# Patient Record
Sex: FEMALE | Race: WHITE | NOT HISPANIC OR LATINO | Employment: OTHER | URBAN - METROPOLITAN AREA
[De-identification: names, ages, dates, MRNs, and addresses within clinical notes are randomized per-mention and may not be internally consistent; named-entity substitution may affect disease eponyms.]

---

## 2019-09-25 ENCOUNTER — OFFICE VISIT (OUTPATIENT)
Dept: PULMONOLOGY | Facility: CLINIC | Age: 64
End: 2019-09-25

## 2019-09-25 VITALS
BODY MASS INDEX: 45.36 KG/M2 | OXYGEN SATURATION: 96 % | HEART RATE: 81 BPM | HEIGHT: 67 IN | TEMPERATURE: 97.7 F | DIASTOLIC BLOOD PRESSURE: 80 MMHG | SYSTOLIC BLOOD PRESSURE: 120 MMHG | WEIGHT: 289 LBS

## 2019-09-25 DIAGNOSIS — R06.02 SOB (SHORTNESS OF BREATH): Primary | ICD-10-CM

## 2019-09-25 DIAGNOSIS — E66.01 OBESITY, CLASS III, BMI 40-49.9 (MORBID OBESITY) (HCC): ICD-10-CM

## 2019-09-25 DIAGNOSIS — G47.33 OSA ON CPAP: ICD-10-CM

## 2019-09-25 DIAGNOSIS — J45.909 IDIOPATHIC ASTHMA: ICD-10-CM

## 2019-09-25 DIAGNOSIS — Z78.9 NON-SMOKER: ICD-10-CM

## 2019-09-25 DIAGNOSIS — Z99.89 OSA ON CPAP: ICD-10-CM

## 2019-09-25 PROCEDURE — 86003 ALLG SPEC IGE CRUDE XTRC EA: CPT | Performed by: INTERNAL MEDICINE

## 2019-09-25 PROCEDURE — 83520 IMMUNOASSAY QUANT NOS NONAB: CPT | Performed by: INTERNAL MEDICINE

## 2019-09-25 PROCEDURE — 82785 ASSAY OF IGE: CPT | Performed by: INTERNAL MEDICINE

## 2019-09-25 PROCEDURE — 94729 DIFFUSING CAPACITY: CPT | Performed by: INTERNAL MEDICINE

## 2019-09-25 PROCEDURE — 36415 COLL VENOUS BLD VENIPUNCTURE: CPT | Performed by: INTERNAL MEDICINE

## 2019-09-25 PROCEDURE — 85007 BL SMEAR W/DIFF WBC COUNT: CPT | Performed by: INTERNAL MEDICINE

## 2019-09-25 PROCEDURE — 85027 COMPLETE CBC AUTOMATED: CPT | Performed by: INTERNAL MEDICINE

## 2019-09-25 PROCEDURE — 94060 EVALUATION OF WHEEZING: CPT | Performed by: INTERNAL MEDICINE

## 2019-09-25 PROCEDURE — 80053 COMPREHEN METABOLIC PANEL: CPT | Performed by: INTERNAL MEDICINE

## 2019-09-25 PROCEDURE — 99204 OFFICE O/P NEW MOD 45 MIN: CPT | Performed by: INTERNAL MEDICINE

## 2019-09-25 PROCEDURE — 86256 FLUORESCENT ANTIBODY TITER: CPT | Performed by: INTERNAL MEDICINE

## 2019-09-25 PROCEDURE — 94726 PLETHYSMOGRAPHY LUNG VOLUMES: CPT | Performed by: INTERNAL MEDICINE

## 2019-09-25 RX ORDER — MONTELUKAST SODIUM 10 MG/1
10 TABLET ORAL EVERY EVENING
Refills: 5 | COMMUNITY
Start: 2019-07-17

## 2019-09-25 RX ORDER — CETIRIZINE HYDROCHLORIDE 10 MG/1
10 TABLET ORAL DAILY
COMMUNITY

## 2019-09-25 RX ORDER — FLUOXETINE HYDROCHLORIDE 20 MG/1
CAPSULE ORAL
COMMUNITY
Start: 2019-09-24 | End: 2022-10-25

## 2019-09-25 RX ORDER — BISOPROLOL FUMARATE AND HYDROCHLOROTHIAZIDE 5; 6.25 MG/1; MG/1
1 TABLET ORAL DAILY
Refills: 2 | COMMUNITY
Start: 2019-07-02

## 2019-09-25 RX ORDER — ALBUTEROL SULFATE 90 UG/1
AEROSOL, METERED RESPIRATORY (INHALATION)
COMMUNITY
Start: 2019-08-16 | End: 2022-03-23

## 2019-09-25 RX ORDER — NAPROXEN 500 MG/1
TABLET ORAL
COMMUNITY

## 2019-09-25 RX ORDER — ALBUTEROL SULFATE 90 UG/1
AEROSOL, METERED RESPIRATORY (INHALATION)
COMMUNITY
End: 2022-10-25 | Stop reason: SDUPTHER

## 2019-09-25 RX ORDER — SACCHAROMYCES BOULARDII 250 MG
250 CAPSULE ORAL 2 TIMES DAILY
COMMUNITY
End: 2022-03-23

## 2019-09-25 RX ORDER — ESTROGENS, CONJUGATED 1.25 MG
1.25 TABLET ORAL DAILY
Refills: 11 | COMMUNITY
Start: 2019-09-05 | End: 2022-03-23

## 2019-09-25 RX ORDER — MULTIPLE VITAMINS W/ MINERALS TAB 9MG-400MCG
1 TAB ORAL DAILY
COMMUNITY

## 2019-09-25 RX ORDER — ABATACEPT 125 MG/ML
INJECTION, SOLUTION SUBCUTANEOUS
COMMUNITY
Start: 2019-09-19

## 2019-09-25 RX ORDER — ALBUTEROL SULFATE 90 UG/1
2 AEROSOL, METERED RESPIRATORY (INHALATION) ONCE
Status: COMPLETED | OUTPATIENT
Start: 2019-09-25 | End: 2019-09-25

## 2019-09-25 RX ORDER — HYDROXYCHLOROQUINE SULFATE 200 MG/1
200 TABLET, FILM COATED ORAL 2 TIMES DAILY
Refills: 1 | COMMUNITY
Start: 2019-08-23

## 2019-09-25 RX ADMIN — ALBUTEROL SULFATE 4 PUFF: 90 AEROSOL, METERED RESPIRATORY (INHALATION) at 15:22

## 2019-09-26 PROBLEM — Z99.89 OSA ON CPAP: Status: ACTIVE | Noted: 2019-09-26

## 2019-09-26 PROBLEM — Z78.9 NON-SMOKER: Status: ACTIVE | Noted: 2019-09-26

## 2019-09-26 PROBLEM — G47.33 OSA ON CPAP: Status: ACTIVE | Noted: 2019-09-26

## 2019-09-26 PROBLEM — E66.01 OBESITY, CLASS III, BMI 40-49.9 (MORBID OBESITY) (HCC): Status: ACTIVE | Noted: 2019-09-26

## 2019-09-26 PROBLEM — J45.909 IDIOPATHIC ASTHMA: Status: ACTIVE | Noted: 2019-09-26

## 2019-09-26 PROBLEM — M06.9 RHEUMATOID ARTHRITIS (HCC): Status: ACTIVE | Noted: 2019-09-26

## 2019-09-26 LAB
ALBUMIN SERPL-MCNC: 4.1 G/DL (ref 3.5–5.2)
ALBUMIN/GLOB SERPL: 1.9 G/DL
ALP SERPL-CCNC: 50 U/L (ref 39–117)
ALT SERPL W P-5'-P-CCNC: 21 U/L (ref 1–33)
ANION GAP SERPL CALCULATED.3IONS-SCNC: 14.4 MMOL/L (ref 5–15)
AST SERPL-CCNC: 25 U/L (ref 1–32)
BASOPHILS # BLD MANUAL: 0.11 10*3/MM3 (ref 0–0.2)
BASOPHILS NFR BLD AUTO: 2 % (ref 0–1.5)
BILIRUB SERPL-MCNC: 0.2 MG/DL (ref 0.2–1.2)
BUN BLD-MCNC: 9 MG/DL (ref 8–23)
BUN/CREAT SERPL: 19.1 (ref 7–25)
CALCIUM SPEC-SCNC: 8.9 MG/DL (ref 8.6–10.5)
CHLORIDE SERPL-SCNC: 105 MMOL/L (ref 98–107)
CO2 SERPL-SCNC: 26.6 MMOL/L (ref 22–29)
CREAT BLD-MCNC: 0.47 MG/DL (ref 0.57–1)
DEPRECATED RDW RBC AUTO: 44.2 FL (ref 37–54)
EOSINOPHIL # BLD MANUAL: 0.06 10*3/MM3 (ref 0–0.4)
EOSINOPHIL NFR BLD MANUAL: 1 % (ref 0.3–6.2)
ERYTHROCYTE [DISTWIDTH] IN BLOOD BY AUTOMATED COUNT: 13.6 % (ref 12.3–15.4)
GFR SERPL CREATININE-BSD FRML MDRD: 134 ML/MIN/1.73
GLOBULIN UR ELPH-MCNC: 2.2 GM/DL
GLUCOSE BLD-MCNC: 139 MG/DL (ref 65–99)
HCT VFR BLD AUTO: 43 % (ref 34–46.6)
HGB BLD-MCNC: 13.6 G/DL (ref 12–15.9)
LYMPHOCYTES # BLD MANUAL: 0.75 10*3/MM3 (ref 0.7–3.1)
LYMPHOCYTES NFR BLD MANUAL: 13.3 % (ref 19.6–45.3)
LYMPHOCYTES NFR BLD MANUAL: 5.1 % (ref 5–12)
MCH RBC QN AUTO: 27.9 PG (ref 26.6–33)
MCHC RBC AUTO-ENTMCNC: 31.6 G/DL (ref 31.5–35.7)
MCV RBC AUTO: 88.3 FL (ref 79–97)
MONOCYTES # BLD AUTO: 0.29 10*3/MM3 (ref 0.1–0.9)
NEUTROPHILS # BLD AUTO: 4.41 10*3/MM3 (ref 1.7–7)
NEUTROPHILS NFR BLD MANUAL: 78.6 % (ref 42.7–76)
PLAT MORPH BLD: NORMAL
PLATELET # BLD AUTO: 219 10*3/MM3 (ref 140–450)
PMV BLD AUTO: 10.1 FL (ref 6–12)
POTASSIUM BLD-SCNC: 3.5 MMOL/L (ref 3.5–5.2)
PROT SERPL-MCNC: 6.3 G/DL (ref 6–8.5)
RBC # BLD AUTO: 4.87 10*6/MM3 (ref 3.77–5.28)
RBC MORPH BLD: NORMAL
SODIUM BLD-SCNC: 146 MMOL/L (ref 136–145)
WBC MORPH BLD: NORMAL
WBC NRBC COR # BLD: 5.61 10*3/MM3 (ref 3.4–10.8)

## 2019-09-26 NOTE — PROGRESS NOTES
PULMONARY  NOTE    Chief Complaint     Dyspnea, rheumatoid arthritis, non-smoker, obstructive sleep apnea, morbid obesity    History of Present Illness     64-year-old white female referred for evaluation of recurrent respiratory symptoms.    She is a lifelong non-smoker.  She does have a history of asthma.    She has dyspnea on exertion, not at rest.  She would get short of breath going up about 1 flight of steps.    She has frequent episodes of acute bronchitis.  She is been on approximately 4 courses of antibiotics in the last year for acute respiratory tract infections.    She has only intermittent reflux symptoms and does not have reflux on a regular basis.    She typically does not have a cough except when she has an acute respiratory tract infection.    She does have sinus drainage and postnasal drip fairly regularly.  She has not had allergy testing or allergy shots.    She has a history of rheumatoid arthritis and is on Plaquenil and methotrexate as well as Orencia.    She has been on Advair 250 with albuterol.    She has a history of obstructive sleep apnea and uses CPAP nightly.  She continues to have daytime fatigue and nonrestorative sleep.    Patient Active Problem List   Diagnosis   • Chronic persistent asthma   • Non-smoker   • Obesity, Class III, BMI 40-49.9 (morbid obesity) (CMS/[facility])   • YARA on CPAP   • Rheumatoid arthritis (CMS/[facility])     Allergies   Allergen Reactions   • Adalimumab Hives   • Benoxinate Other (See Comments)       Current Outpatient Medications:   •  ADVAIR DISKUS 250-50 MCG/DOSE DISKUS, INHALE 1 PUFF BY MOUTH EVERY 12 HOURS IN THE MORNING AND IN THE EVENING, Disp: , Rfl: 5  •  albuterol sulfate HFA (PROAIR HFA) 108 (90 Base) MCG/ACT inhaler, ProAir HFA 90 mcg/actuation aerosol inhaler, Disp: , Rfl:   •  albuterol sulfate  (90 Base) MCG/ACT inhaler, , Disp: , Rfl:   •  bisoprolol-hydrochlorothiazide (ZIAC) 5-6.25 MG per tablet, Take 1 tablet by mouth Daily., Disp: , Rfl:  "2  •  cetirizine (zyrTEC) 10 MG tablet, Take 10 mg by mouth Daily., Disp: , Rfl:   •  FLUoxetine (PROzac) 20 MG capsule, , Disp: , Rfl:   •  hydroxychloroquine (PLAQUENIL) 200 MG tablet, Take 200 mg by mouth 2 (Two) Times a Day., Disp: , Rfl: 1  •  Influenza Vac Subunit Quad (FLUCELVAX) 0.5 ML suspension prefilled syringe injection, Flucelvax Quad [id number] (PF) 60 mcg (15 mcg x 4)/0.5 mL IM syringe, Disp: , Rfl:   •  methotrexate 2.5 MG tablet, Take  by mouth 3 (Three) Doses Each Week. Take Doses 12 (Twelve) Hours Apart., Disp: , Rfl:   •  montelukast (SINGULAIR) 10 MG tablet, Take 10 mg by mouth Every Evening., Disp: , Rfl: 5  •  Multiple Vitamins-Minerals (MULTIVITAMIN WITH MINERALS) tablet tablet, Take 1 tablet by mouth Daily., Disp: , Rfl:   •  naproxen (NAPROSYN) 500 MG tablet, naproxen 500 mg tablet, Disp: , Rfl:   •  ORENCIA 125 MG/ML solution prefilled syringe, , Disp: , Rfl:   •  PREMARIN 1.25 MG tablet, Take 1.25 mg by mouth Daily., Disp: , Rfl: 11  •  saccharomyces boulardii (FLORASTOR) 250 MG capsule, Take 250 mg by mouth 2 (Two) Times a Day., Disp: , Rfl:   No current facility-administered medications for this visit.   MEDICATION LIST AND ALLERGIES REVIEWED.    Family History   Problem Relation Age of Onset   • Diabetes Mother    • Diabetes Father    • Heart failure Father    • Diabetes Sister    • Diabetes Brother      Social History     Tobacco Use   • Smoking status: Never Smoker   • Smokeless tobacco: Never Used   Substance Use Topics   • Alcohol use: No     Frequency: Never   • Drug use: No     Social History     Social History Narrative        Retired teacher and artist    Lifelong non-smoker    Seldom drinks alcohol    No history of drug abuse     FAMILY AND SOCIAL HISTORY REVIEWED.    Review of Systems  ALSO REFER TO SCANNED ROS SHEET FROM SAME DATE.    /80 (BP Location: Left arm, Patient Position: Sitting)   Pulse 81   Temp 97.7 °F (36.5 °C)   Ht 170.2 cm (67\")   Wt 131 kg " (289 lb)   SpO2 96% Comment: resting at room air  BMI 45.26 kg/m²   Physical Exam   Constitutional: She is oriented to person, place, and time. She appears well-developed. No distress.   HENT:   Head: Normocephalic and atraumatic.   Neck: No thyromegaly present.   Cardiovascular: Normal rate, regular rhythm and normal heart sounds.   No murmur heard.  Pulmonary/Chest: Effort normal and breath sounds normal. No stridor.   Abdominal: Soft. Bowel sounds are normal.   Musculoskeletal: Normal range of motion. She exhibits no edema.   Lymphadenopathy:     She has no cervical adenopathy.        Right: No supraclavicular and no epitrochlear adenopathy present.        Left: No supraclavicular and no epitrochlear adenopathy present.   Neurological: She is alert and oriented to person, place, and time.   Skin: Skin is warm and dry. She is not diaphoretic.   Psychiatric: She has a normal mood and affect. Her behavior is normal.   Nursing note and vitals reviewed.      Results     PFTs reveal moderate airway obstruction, no significant change in FEV1 after bronchodilator.  No restriction in normal corrected diffusion capacity.    PA and lateral chest x-ray reveals no evidence of active disease    Problem List       ICD-10-CM ICD-9-CM   1. SOB (shortness of breath) R06.02 786.05   2. Chronic persistent asthma J45.909 493.90   3. Non-smoker Z78.9 V49.89   4. Obesity, Class III, BMI 40-49.9 (morbid obesity) (CMS/[facility]) E66.01 278.01   5. YARA on CPAP G47.33 327.23    Z99.89 V46.8       Discussion     We reviewed her test results.  She has moderate obstructive airways disease.  This is most likely due to chronic persistent asthma.  An obliterative bronchiolitis related to her underlying autoimmune disease could be a consideration, as well.    I have recommended increased ICS therapy.  I changed her Advair to Breo 201 puff a day with the addition of Arnuity 201 puff a day.  We will also check an IgE level and eosinophil count.  She  might benefit from a biologic agent.    We will also check an alpha-1 antitrypsin screen.    Given her recurrent respiratory tract infections and autoimmune disease, I recommended a high-resolution CT scan of the chest which we will arrange.    She may not be getting adequate oxygen supplementation at night despite her CPAP.  We will check 2 nights of nocturnal pulse oximetry with her on CPAP.  If pulse oximetry does not look good, then would get a titration sleep study.  She has nonrestorative sleep, daytime fatigue and hypersomnolence.    I recommended a regular exercise program and efforts at weight loss.    She would benefit from a transthoracic echocardiogram.    I will plan to see her back in a month or so for follow-up    Foreign Gray MD  Note electronically signed    CC: Josafat King MD

## 2019-09-28 LAB
C-ANCA TITR SER IF: NORMAL TITER
MYELOPEROXIDASE AB SER-ACNC: <9 U/ML (ref 0–9)
P-ANCA ATYPICAL TITR SER IF: NORMAL TITER
P-ANCA TITR SER IF: NORMAL TITER
PROTEINASE3 AB SER IA-ACNC: <3.5 U/ML (ref 0–3.5)

## 2019-09-30 LAB
A ALTERNATA IGE QN: <0.1 KU/L
A FUMIGATUS IGE QN: <0.1 KU/L
AMER ROACH IGE QN: <0.1 KU/L
BAHIA GRASS IGE QN: <0.1 KU/L
BERMUDA GRASS IGE QN: <0.1 KU/L
BOXELDER IGE QN: <0.1 KU/L
C HERBARUM IGE QN: <0.1 KU/L
CAT DANDER IGG QN: 0.26 KU/L
CMN PIGWEED IGE QN: <0.1 KU/L
COMMON RAGWEED IGE QN: <0.1 KU/L
CONV CLASS DESCRIPTION: ABNORMAL
D FARINAE IGE QN: <0.1 KU/L
D PTERONYSS IGE QN: 0.16 KU/L
DOG DANDER IGE QN: 0.24 KU/L
ENGL PLANTAIN IGE QN: <0.1 KU/L
HAZELNUT POLN IGE QN: <0.1 KU/L
JOHNSON GRASS IGE QN: <0.1 KU/L
KENT BLUE GRASS IGE QN: <0.1 KU/L
M RACEMOSUS IGE QN: <0.1 KU/L
MT JUNIPER IGE QN: <0.1 KU/L
MUGWORT IGE QN: <0.1 KU/L
NETTLE IGE QN: <0.1 KU/L
P NOTATUM IGE QN: <0.1 KU/L
S BOTRYOSUM IGE QN: <0.1 KU/L
SHEEP SORREL IGE QN: <0.1 KU/L
SWEET GUM IGE QN: <0.1 KU/L
T011-IGE MAPLE LEAF SYCAMORE: <0.1 KU/L
WHITE ELM IGE QN: 0.11 KU/L
WHITE HICKORY IGE QN: <0.1 KU/L
WHITE MULBERRY IGE QN: <0.1 KU/L
WHITE OAK IGE QN: <0.1 KU/L

## 2019-10-01 LAB — TOTAL IGE SMQN RAST: 21 IU/ML (ref 6–495)

## 2019-10-03 ENCOUNTER — DOCUMENTATION (OUTPATIENT)
Dept: PULMONOLOGY | Facility: CLINIC | Age: 64
End: 2019-10-03

## 2019-10-03 DIAGNOSIS — G47.33 OSA ON CPAP: Primary | ICD-10-CM

## 2019-10-03 DIAGNOSIS — Z99.89 OSA ON CPAP: Primary | ICD-10-CM

## 2019-10-03 NOTE — PROGRESS NOTES
I received ONPO results back from [facility]. [facility] wants to order oxygen but the patient has YARA and she must have a sleep study titration study first per insurance to get oxygen added. I have called the patient and LVM for her to return my call.

## 2019-10-16 ENCOUNTER — OFFICE VISIT (OUTPATIENT)
Dept: PULMONOLOGY | Facility: CLINIC | Age: 64
End: 2019-10-16

## 2019-10-16 VITALS
HEART RATE: 76 BPM | BODY MASS INDEX: 45.83 KG/M2 | DIASTOLIC BLOOD PRESSURE: 80 MMHG | OXYGEN SATURATION: 92 % | SYSTOLIC BLOOD PRESSURE: 130 MMHG | HEIGHT: 67 IN | WEIGHT: 292 LBS | TEMPERATURE: 98.4 F

## 2019-10-16 DIAGNOSIS — J45.909 IDIOPATHIC ASTHMA: ICD-10-CM

## 2019-10-16 DIAGNOSIS — Z78.9 NON-SMOKER: ICD-10-CM

## 2019-10-16 DIAGNOSIS — Z99.89 OSA ON CPAP: ICD-10-CM

## 2019-10-16 DIAGNOSIS — E66.01 OBESITY, CLASS III, BMI 40-49.9 (MORBID OBESITY) (HCC): ICD-10-CM

## 2019-10-16 DIAGNOSIS — R06.02 SOB (SHORTNESS OF BREATH): Primary | ICD-10-CM

## 2019-10-16 DIAGNOSIS — G47.33 OSA ON CPAP: ICD-10-CM

## 2019-10-16 PROCEDURE — 94060 EVALUATION OF WHEEZING: CPT | Performed by: INTERNAL MEDICINE

## 2019-10-16 PROCEDURE — 99214 OFFICE O/P EST MOD 30 MIN: CPT | Performed by: INTERNAL MEDICINE

## 2019-10-16 RX ORDER — ALBUTEROL SULFATE 90 UG/1
2 AEROSOL, METERED RESPIRATORY (INHALATION) ONCE
Status: COMPLETED | OUTPATIENT
Start: 2019-10-16 | End: 2019-10-16

## 2019-10-16 RX ADMIN — ALBUTEROL SULFATE 4 PUFF: 90 AEROSOL, METERED RESPIRATORY (INHALATION) at 10:31

## 2019-10-17 NOTE — PROGRESS NOTES
PULMONARY  NOTE    Chief Complaint     Chronic persistent asthma, non-smoker, rheumatoid arthritis, class III obesity, obstructive sleep apnea and nocturnal hypoxemia    History of Present Illness     64-year-old white female returns today for follow-up.  Initially saw her on 9/25/2019.    She is a lifelong non-smoker but does have a history of asthma.  Prior PFTs revealed moderate airway obstruction consistent with chronic persistent asthma.  On her last visit I added Arnuity 200 one puff to her Breo 200.    We obtained the labs but IgE and eosinophil count were unremarkable.    We did 2 nights of nocturnal pulse oximetry.  She does desaturate despite CPAP.  Insurance will not allow us just add supplemental oxygen, she has to have a titration sleep study.    For some reason alpha-1 antitrypsin screen is not back yet.    She has a history of rheumatoid arthritis and remains on Plaquenil, methotrexate, and Orencia.    She has sinus congestion, drainage, and postnasal drip which tends to be seasonal.    Only intermittent reflux symptoms and not on a regular basis.    Patient Active Problem List   Diagnosis   • Chronic persistent asthma   • Non-smoker   • Obesity, Class III, BMI 40-49.9 (morbid obesity) (CMS/[facility])   • YARA on CPAP   • Rheumatoid arthritis (CMS/[facility])     Allergies   Allergen Reactions   • Adalimumab Hives   • Benoxinate Other (See Comments)       Current Outpatient Medications:   •  albuterol sulfate HFA (PROAIR HFA) 108 (90 Base) MCG/ACT inhaler, ProAir HFA 90 mcg/actuation aerosol inhaler, Disp: , Rfl:   •  albuterol sulfate  (90 Base) MCG/ACT inhaler, , Disp: , Rfl:   •  bisoprolol-hydrochlorothiazide (ZIAC) 5-6.25 MG per tablet, Take 1 tablet by mouth Daily., Disp: , Rfl: 2  •  cetirizine (zyrTEC) 10 MG tablet, Take 10 mg by mouth Daily., Disp: , Rfl:   •  FLUoxetine (PROzac) 20 MG capsule, , Disp: , Rfl:   •  Fluticasone Furoate (ARNUITY ELLIPTA) 200 MCG/ACT aerosol powder , Inhale., Disp: ,  Rfl:   •  Fluticasone Furoate-Vilanterol (BREO ELLIPTA) 200-25 MCG/INH inhaler, Inhale 1 puff Daily., Disp: , Rfl:   •  hydroxychloroquine (PLAQUENIL) 200 MG tablet, Take 200 mg by mouth 2 (Two) Times a Day., Disp: , Rfl: 1  •  Methotrexate 2.5 MG/ML solution, Take  by mouth., Disp: , Rfl:   •  montelukast (SINGULAIR) 10 MG tablet, Take 10 mg by mouth Every Evening., Disp: , Rfl: 5  •  Multiple Vitamins-Minerals (MULTIVITAMIN WITH MINERALS) tablet tablet, Take 1 tablet by mouth Daily., Disp: , Rfl:   •  naproxen (NAPROSYN) 500 MG tablet, naproxen 500 mg tablet, Disp: , Rfl:   •  ORENCIA 125 MG/ML solution prefilled syringe, , Disp: , Rfl:   •  PREMARIN 1.25 MG tablet, Take 1.25 mg by mouth Daily., Disp: , Rfl: 11  •  saccharomyces boulardii (FLORASTOR) 250 MG capsule, Take 250 mg by mouth 2 (Two) Times a Day., Disp: , Rfl:   •  ADVAIR DISKUS 250-50 MCG/DOSE DISKUS, INHALE 1 PUFF BY MOUTH EVERY 12 HOURS IN THE MORNING AND IN THE EVENING, Disp: , Rfl: 5  •  Influenza Vac Subunit Quad (FLUCELVAX) 0.5 ML suspension prefilled syringe injection, Flucelvax Quad [id number] (PF) 60 mcg (15 mcg x 4)/0.5 mL IM syringe, Disp: , Rfl:   •  methotrexate 2.5 MG tablet, Take  by mouth 3 (Three) Doses Each Week. Take Doses 12 (Twelve) Hours Apart., Disp: , Rfl:   No current facility-administered medications for this visit.   MEDICATION LIST AND ALLERGIES REVIEWED.    Family History   Problem Relation Age of Onset   • Diabetes Mother    • Diabetes Father    • Heart failure Father    • Diabetes Sister    • Diabetes Brother      Social History     Tobacco Use   • Smoking status: Never Smoker   • Smokeless tobacco: Never Used   Substance Use Topics   • Alcohol use: No     Frequency: Never   • Drug use: No     Social History     Social History Narrative        Retired teacher and artist    Lifelong non-smoker    Seldom drinks alcohol    No history of drug abuse     FAMILY AND SOCIAL HISTORY REVIEWED.    Review of Systems  ALSO REFER  "TO SCANNED ROS SHEET FROM SAME DATE.    /80   Pulse 76   Temp 98.4 °F (36.9 °C)   Ht 170.2 cm (67\")   Wt 132 kg (292 lb)   SpO2 92% Comment: room air at rest  BMI 45.73 kg/m²   Physical Exam   Constitutional: She is oriented to person, place, and time. She appears well-developed. No distress.   HENT:   Head: Normocephalic and atraumatic.   Neck: No thyromegaly present.   Cardiovascular: Normal rate, regular rhythm and normal heart sounds.   No murmur heard.  Pulmonary/Chest: Effort normal and breath sounds normal. No stridor.   Abdominal: Soft. Bowel sounds are normal.   Musculoskeletal: Normal range of motion. She exhibits no edema.   Lymphadenopathy:     She has no cervical adenopathy.        Right: No supraclavicular and no epitrochlear adenopathy present.        Left: No supraclavicular and no epitrochlear adenopathy present.   Neurological: She is alert and oriented to person, place, and time.   Skin: Skin is warm and dry. She is not diaphoretic.   Psychiatric: She has a normal mood and affect. Her behavior is normal.   Nursing note and vitals reviewed.      Results     Labs obtained last visit were relatively unremarkable with a normal IgE level, total eosinophil count of 60.  Rast panel was really only if positive for cat, dog, and dust mites.    Desaturates periodically at night with nocturnal pulse oximetry despite CPAP.    Alpha-1 antitrypsin screen still pending    Problem List       ICD-10-CM ICD-9-CM   1. SOB (shortness of breath) R06.02 786.05   2. Chronic persistent asthma J45.909 493.90   3. Non-smoker Z78.9 V49.89   4. Obesity, Class III, BMI 40-49.9 (morbid obesity) (CMS/[facility]) E66.01 278.01   5. YARA on CPAP G47.33 327.23    Z99.89 V46.8       Discussion     We discussed her test results.  At this point, labs really do not support the use of a biologic agent for asthma.    She currently is doing okay with Breo 200 and Arnuity 200, 1 puff of each on a daily basis.    In the future, might " consider an HRCT scan to see if there is any evidence of obliterative bronchiolitis associated with her rheumatoid arthritis.    We need to follow-up on her alpha-1 antitrypsin screen.    Unfortunately, she must have a titration sleep study to be able to get supplemental oxygen with her CPAP (or other modification of her YARA therapy).    In the future, consider a transthoracic echocardiogram.    I will plan to see her back in a couple of months for follow-up    Foreign Gray MD  Note electronically signed    CC: Josafat King MD

## 2019-12-29 ENCOUNTER — HOSPITAL ENCOUNTER (OUTPATIENT)
Dept: SLEEP MEDICINE | Facility: HOSPITAL | Age: 64
Discharge: HOME | End: 2019-12-29
Admitting: INTERNAL MEDICINE

## 2019-12-29 VITALS
HEART RATE: 98 BPM | SYSTOLIC BLOOD PRESSURE: 174 MMHG | DIASTOLIC BLOOD PRESSURE: 89 MMHG | HEIGHT: 67 IN | OXYGEN SATURATION: 96 % | BODY MASS INDEX: 45.99 KG/M2 | WEIGHT: 293 LBS

## 2019-12-29 DIAGNOSIS — G47.33 OSA ON CPAP: ICD-10-CM

## 2019-12-29 DIAGNOSIS — Z99.89 OSA ON CPAP: ICD-10-CM

## 2019-12-29 PROCEDURE — 95811 POLYSOM 6/>YRS CPAP 4/> PARM: CPT

## 2019-12-29 PROCEDURE — 95811 POLYSOM 6/>YRS CPAP 4/> PARM: CPT | Performed by: INTERNAL MEDICINE

## 2020-01-15 ENCOUNTER — TELEPHONE (OUTPATIENT)
Dept: PULMONOLOGY | Facility: CLINIC | Age: 65
End: 2020-01-15

## 2020-02-28 ENCOUNTER — OFFICE VISIT (OUTPATIENT)
Dept: PULMONOLOGY | Facility: CLINIC | Age: 65
End: 2020-02-28

## 2020-02-28 VITALS
WEIGHT: 284 LBS | TEMPERATURE: 97.3 F | OXYGEN SATURATION: 95 % | HEART RATE: 74 BPM | BODY MASS INDEX: 44.57 KG/M2 | DIASTOLIC BLOOD PRESSURE: 70 MMHG | SYSTOLIC BLOOD PRESSURE: 120 MMHG | HEIGHT: 67 IN

## 2020-02-28 DIAGNOSIS — R06.02 SOB (SHORTNESS OF BREATH): Primary | ICD-10-CM

## 2020-02-28 DIAGNOSIS — Z99.89 OSA ON CPAP: ICD-10-CM

## 2020-02-28 DIAGNOSIS — Z78.9 NON-SMOKER: ICD-10-CM

## 2020-02-28 DIAGNOSIS — E66.01 OBESITY, CLASS III, BMI 40-49.9 (MORBID OBESITY) (HCC): ICD-10-CM

## 2020-02-28 DIAGNOSIS — G47.33 OSA ON CPAP: ICD-10-CM

## 2020-02-28 DIAGNOSIS — G47.34 NOCTURNAL HYPOXIA: ICD-10-CM

## 2020-02-28 DIAGNOSIS — J45.909 IDIOPATHIC ASTHMA: ICD-10-CM

## 2020-02-28 PROCEDURE — 99214 OFFICE O/P EST MOD 30 MIN: CPT | Performed by: NURSE PRACTITIONER

## 2020-02-28 PROCEDURE — 94375 RESPIRATORY FLOW VOLUME LOOP: CPT | Performed by: NURSE PRACTITIONER

## 2020-02-28 NOTE — PROGRESS NOTES
[facility] Pulmonary Follow Up Note    Chief Complaint:  Chief Complaint   Patient presents with   • Shortness of Breath   • Follow-up       History of Present Illness:  Amaya Brown is a 64 y.o.female here today for Follow-up SOB and Chronic Persistent Asthma.  She was last seen in the office on 10/16/2019 by Dr. Ricardo Gray.    She is a lifelong non-smoker, but has a history of asthma. PFTs from initial office visit in September, 2019 revealed moderate airway obstruction consistent with chronic persistent asthma. Arnuity 200 was added to her Breo 200 with improvement in symptoms. She has rarely needed her albuterol inhaler. Her SOB has significantly improved. She denies productive cough, hemoptysis, chest pain, palpitations, or lower extremity edema.     Patient did have Flu-like symptoms a few weeks ago and was treated with Omnicor, but did not tolerate it d/t diarrhea. She subsequently developed a UTI treated with Cipro with improvement. She has not been on any other antibiotics or required steroids since her last OV.    Labs were obtained. IgE and eosinophil count were unremarkable. ANCA Panel WNL. Alpha-1 antitrypsin screen was negative with Genotype M/M. Allergen panel was significantly positive for cat, dog, dust mites, and Elm. Patient also states that horses are a major trigger for her as well.      She does have YARA with CPAP use. ONPO indicated need for oxygen as well, which was confirmed with a Titration Sleep Study. She now has 2 L supplemental O2 bled into CPAP, that she wears nightly. She states that she is sleeping good and feel refreshed in the morning.      She has a history of rheumatoid arthritis and [facility]. She has been on Plaquenil and methotrexate for over 10 years. She was more recently started on Humira, but did not tolerate so she was switched to Orencia.     She does admit to seasonal sinus congestion with drainage and postnasal drip. She  states that she previously had some reflux symptoms, but has not had any for several months.     She is also working on weight loss and had an intentional 18 pound loss since she was in the office last.     I have reviewed the patients past medical, past surgical and family history as noted in Epic.     Subjective     Social History     Socioeconomic History   • Marital status:      Spouse name: Not on file   • Number of children: Not on file   • Years of education: Not on file   • Highest education level: Not on file   Tobacco Use   • Smoking status: Never Smoker   • Smokeless tobacco: Never Used   Substance and Sexual Activity   • Alcohol use: No     Frequency: Never   • Drug use: No   • Sexual activity: Defer   Social History Narrative        Retired teacher and artist    Lifelong non-smoker    Seldom drinks alcohol    No history of drug abuse         Current Outpatient Medications:   •  albuterol sulfate HFA (PROAIR HFA) 108 (90 Base) MCG/ACT inhaler, ProAir HFA 90 mcg/actuation aerosol inhaler, Disp: , Rfl:   •  albuterol sulfate  (90 Base) MCG/ACT inhaler, , Disp: , Rfl:   •  bisoprolol-hydrochlorothiazide (ZIAC) 5-6.25 MG per tablet, Take 1 tablet by mouth Daily., Disp: , Rfl: 2  •  cetirizine (zyrTEC) 10 MG tablet, Take 10 mg by mouth Daily., Disp: , Rfl:   •  FLUoxetine (PROzac) 20 MG capsule, , Disp: , Rfl:   •  Fluticasone Furoate (ARNUITY ELLIPTA) 200 MCG/ACT aerosol powder , Inhale 1 Act Daily., Disp: 30 each, Rfl: 11  •  Fluticasone Furoate-Vilanterol (BREO ELLIPTA) 200-25 MCG/INH inhaler, Inhale 1 puff Daily., Disp: 28 each, Rfl: 11  •  hydroxychloroquine (PLAQUENIL) 200 MG tablet, Take 200 mg by mouth 2 (Two) Times a Day., Disp: , Rfl: 1  •  Influenza Vac Subunit Quad (FLUCELVAX) 0.5 ML suspension prefilled syringe injection, Flucelvax Quad [id number] (PF) 60 mcg (15 mcg x 4)/0.5 mL IM syringe, Disp: , Rfl:   •  methotrexate 2.5 MG tablet, Take  by mouth 3 (Three) Doses Each Week.  Take Doses 12 (Twelve) Hours Apart., Disp: , Rfl:   •  montelukast (SINGULAIR) 10 MG tablet, Take 10 mg by mouth Every Evening., Disp: , Rfl: 5  •  Multiple Vitamins-Minerals (MULTIVITAMIN WITH MINERALS) tablet tablet, Take 1 tablet by mouth Daily., Disp: , Rfl:   •  naproxen (NAPROSYN) 500 MG tablet, naproxen 500 mg tablet, Disp: , Rfl:   •  ORENCIA 125 MG/ML solution prefilled syringe, , Disp: , Rfl:   •  PREMARIN 1.25 MG tablet, Take 1.25 mg by mouth Daily., Disp: , Rfl: 11  •  saccharomyces boulardii (FLORASTOR) 250 MG capsule, Take 250 mg by mouth 2 (Two) Times a Day., Disp: , Rfl:     Allergies   Allergen Reactions   • Adalimumab Hives   • Benoxinate Other (See Comments)       Immunization History   Administered Date(s) Administered   • Flu Vaccine Intradermal Quad 18-64YR 02/02/2017, 10/11/2017, 10/25/2018   • Flu Vaccine Quad PF >36MO 10/09/2018   • Influenza, Unspecified 10/01/2019   • Tdap 10/09/2018   • flucelvax quad pfs =>4 YRS 09/27/2019       Review of Systems:    Review of Systems   Constitutional: Negative for chills, fatigue and fever.   HENT: Negative for congestion, rhinorrhea, sinus pressure, trouble swallowing and voice change.    Eyes: Negative for blurred vision and visual disturbance.   Respiratory: Positive for shortness of breath. Negative for apnea, cough, chest tightness and wheezing.    Cardiovascular: Negative for chest pain, palpitations and leg swelling.   Gastrointestinal: Positive for diarrhea. Negative for abdominal pain, nausea and vomiting.   Endocrine: Negative for cold intolerance and heat intolerance.   Genitourinary: Negative for dysuria and hematuria.   Musculoskeletal: Negative for arthralgias and joint swelling.   Skin: Negative for rash and bruise.   Allergic/Immunologic: Negative for environmental allergies, food allergies and immunocompromised state.   Neurological: Negative for dizziness, speech difficulty, weakness and headache.   Hematological: Negative for  "adenopathy. Does not bruise/bleed easily.   Psychiatric/Behavioral: Negative for dysphoric mood. The patient is not nervous/anxious.        Objective     Vital Signs:  Vitals:    02/28/20 1230   BP: 120/70   Pulse: 74   Temp: 97.3 °F (36.3 °C)   SpO2: 95%  Comment: resting, room air   Weight: 129 kg (284 lb)   Height: 170 cm (66.93\")       Physical Exam:    Physical Exam   Constitutional: She is oriented to person, place, and time. She appears well-developed and well-nourished.   HENT:   Head: Normocephalic and atraumatic.   Mouth/Throat: Oropharynx is clear and moist.   Eyes: Pupils are equal, round, and reactive to light. EOM are normal.   Neck: Normal range of motion. Neck supple.   Cardiovascular: Normal rate and regular rhythm.   Pulmonary/Chest: Effort normal and breath sounds normal. No respiratory distress. She has no wheezes.   Abdominal: Soft. Bowel sounds are normal.   Musculoskeletal: Normal range of motion. She exhibits edema (trace BLE).   Lymphadenopathy:        Head (right side): No submental, no submandibular and no tonsillar adenopathy present.        Head (left side): No submental, no submandibular and no tonsillar adenopathy present.     She has no cervical adenopathy.   Neurological: She is alert and oriented to person, place, and time.   Skin: Skin is warm and dry.   Psychiatric: She has a normal mood and affect. Judgment normal.   Nursing note and vitals reviewed.      Results Review:    I reviewed the patient's new clinical results.    No radiology results for the last 30 days.    Spirometry Interpretation (@TD):   PFTS in the office today, read by me.    1. Mild airway obstruction. (FEV1 > 70% pred.).  2. The maximum voluntary ventilation (MVV) is normal.  3. Pre-Bronchodilators FEV1 = 1.95 L. (71 % of predicted)    Assessment/Plan   Assessment / Plan:    Problem List Items Addressed This Visit        Respiratory    Chronic persistent asthma    Relevant Medications    Fluticasone " Furoate-Vilanterol (BREO ELLIPTA) 200-25 MCG/INH inhaler    Fluticasone Furoate (ARNUITY ELLIPTA) 200 MCG/ACT aerosol powder     YARA on CPAP       Digestive    Obesity, Class III, BMI 40-49.9 (morbid obesity) (CMS/[facility])       Other    Non-smoker      Other Visit Diagnoses     SOB (shortness of breath)    -  Primary    Relevant Orders    Spirometry Without Bronchodilator (Completed)    Nocturnal hypoxia              Discussion:    We discussed her test results today. PFT today show improvement in airway obstruction.  FEV1/FVC ratio from 61% to 67% predicted. FEV1 improved from 64% to 71% since her initial office visit in September, 2019. She states that she feels better and her shortness of breath has improved. She rarely uses her rescue inhaler.      She will continue to use Breo 200 and Arnuity 200, 1 puff of each on a daily basis. She has done well with the addition of Arnuity. I reordered both inhalers and provided samples of Breo in the office today.     Dr Gray mentioned possibly ordering a HRCT scan in the future to see if there is any evidence of obliterative bronchiolitis associated with her rheumatoid arthritis, however patient is doing well. Might consider scan at later date.      She will continue using her CPAP nightly with supplemental 2 L Oxygen. We discussed her Titration Study today. She has good restorative sleep.     Her BMI is 44.6. She is currently attempting to loose weight and has lost 18 pounds. Her goal is to loose 100 pounds over time. She is trying to do it in a healthy way and is using [employer] to track food and calories. I applauded her effort and encouraged continued healthy eating with the addition of physical exercise. This is limited somewhat d/t her RA. We discussed a healthy weight loss of no more that 2 pounds weekly.     She will continue to follow with Dr Gardner for RA at the [facility]. She is currently on Plaquenil, Methotrexate, and Orencia, which she is  tolerating well.     She will follow up in the clinic with Dr Ricardo Gray in the next 5-6 months with repeat spirometry. She will call if she has any additional needs in the interim.     Plan of care was reviewed with the patient at the conclusion of today's visit. Education was provided regarding diagnosis, management, and any prescribed or recommended over the counter medications. Patient verbalizes understanding of and agreement with management plan.     I spent 25 minutes with the patient. I spent > 50% percent of this time counseling and discussing diagnosis, prognosis, diagnostic testing, evaluation, current status, treatment options and management.    NEHEMIAS Claderon  Electronically signed     Please note that portions of this note were completed with a voice recognition program. Efforts were made to edit the dictations, but occasionally words are mistranscribed.

## 2020-08-28 ENCOUNTER — TELEPHONE (OUTPATIENT)
Dept: PULMONOLOGY | Facility: CLINIC | Age: 65
End: 2020-08-28

## 2020-08-28 NOTE — TELEPHONE ENCOUNTER
Samples of Rx Breo 200 and Arnuity 200 placed up front ready for . Pt advised to call the office and staff member will bring to car. Pt verbalized understanding and appreciation.

## 2020-09-25 ENCOUNTER — OFFICE VISIT (OUTPATIENT)
Dept: PULMONOLOGY | Facility: CLINIC | Age: 65
End: 2020-09-25

## 2020-09-25 VITALS
TEMPERATURE: 97.1 F | WEIGHT: 276 LBS | OXYGEN SATURATION: 95 % | SYSTOLIC BLOOD PRESSURE: 132 MMHG | BODY MASS INDEX: 43.32 KG/M2 | HEIGHT: 67 IN | DIASTOLIC BLOOD PRESSURE: 80 MMHG | HEART RATE: 83 BPM

## 2020-09-25 DIAGNOSIS — G47.33 OSA ON CPAP: ICD-10-CM

## 2020-09-25 DIAGNOSIS — Z99.89 OSA ON CPAP: ICD-10-CM

## 2020-09-25 DIAGNOSIS — Z78.9 NON-SMOKER: ICD-10-CM

## 2020-09-25 DIAGNOSIS — E66.01 OBESITY, CLASS III, BMI 40-49.9 (MORBID OBESITY) (HCC): ICD-10-CM

## 2020-09-25 DIAGNOSIS — J45.909 IDIOPATHIC ASTHMA: Primary | ICD-10-CM

## 2020-09-25 DIAGNOSIS — Z87.39 HISTORY OF RHEUMATOID ARTHRITIS: ICD-10-CM

## 2020-09-25 PROCEDURE — 99214 OFFICE O/P EST MOD 30 MIN: CPT | Performed by: INTERNAL MEDICINE

## 2020-09-25 NOTE — PROGRESS NOTES
PULMONARY  NOTE    Chief Complaint     Chronic persistent asthma, non-smoker, rheumatoid arthritis, class III obesity, YARA with nocturnal hypoxemia    History of Present Illness     65-year-old white female returns today for follow-up.  She was last seen by Sade Clay on 2/2020    She is a lifelong non-smoker and has chronic persistent asthma.  She has moderate airway obstruction by prior spirometry  She has been on high-dose ICS with Breo 200 and Arnuity 201 puff of each a day  She is had some insurance changes due to health issues with her .  She is having trouble getting these inhalers covered at this time but after the first of the year apparently will be on Medicare.    IgE level and eosinophil count in the past have been unremarkable.    She has obstructive sleep apnea and uses CPAP.  We also did nocturnal pulse oximetry while on CPAP which revealed nocturnal desaturation  She eventually had undergo a titration sleep study which was performed.  She is now using CPAP with supplemental oxygen at night and tolerating it well    She has intermittent reflux symptoms on a regular basis    She has sinus drainage and postnasal drip typically seasonal    She has rheumatoid arthritis and previously had been on Plaquenil, methotrexate, and Orencia.  Currently, only on Plaquenil do the same insurance issues.  She follows with Dr. Gardner    Patient Active Problem List   Diagnosis   • Chronic persistent asthma   • Non-smoker   • Obesity, Class III, BMI 40-49.9 (morbid obesity) (CMS/[facility])   • YARA on CPAP   • Rheumatoid arthritis     Allergies   Allergen Reactions   • Adalimumab Hives   • Benoxinate Other (See Comments)       Current Outpatient Medications:   •  albuterol sulfate HFA (PROAIR HFA) 108 (90 Base) MCG/ACT inhaler, ProAir HFA 90 mcg/actuation aerosol inhaler, Disp: , Rfl:   •  albuterol sulfate  (90 Base) MCG/ACT inhaler, , Disp: , Rfl:   •  bisoprolol-hydrochlorothiazide (ZIAC) 5-6.25 MG per  tablet, Take 1 tablet by mouth Daily., Disp: , Rfl: 2  •  cetirizine (zyrTEC) 10 MG tablet, Take 10 mg by mouth Daily., Disp: , Rfl:   •  FLUoxetine (PROzac) 20 MG capsule, , Disp: , Rfl:   •  Fluticasone Furoate (ARNUITY ELLIPTA) 200 MCG/ACT aerosol powder , Inhale 1 Act Daily., Disp: 30 each, Rfl: 11  •  Fluticasone Furoate-Vilanterol (BREO ELLIPTA) 200-25 MCG/INH inhaler, Inhale 1 puff Daily., Disp: 28 each, Rfl: 11  •  hydroxychloroquine (PLAQUENIL) 200 MG tablet, Take 200 mg by mouth 2 (Two) Times a Day., Disp: , Rfl: 1  •  Influenza Vac Subunit Quad (FLUCELVAX) 0.5 ML suspension prefilled syringe injection, Flucelvax Quad [id number] (PF) 60 mcg (15 mcg x 4)/0.5 mL IM syringe, Disp: , Rfl:   •  montelukast (SINGULAIR) 10 MG tablet, Take 10 mg by mouth Every Evening., Disp: , Rfl: 5  •  Multiple Vitamins-Minerals (MULTIVITAMIN WITH MINERALS) tablet tablet, Take 1 tablet by mouth Daily., Disp: , Rfl:   •  naproxen (NAPROSYN) 500 MG tablet, naproxen 500 mg tablet, Disp: , Rfl:   •  saccharomyces boulardii (FLORASTOR) 250 MG capsule, Take 250 mg by mouth 2 (Two) Times a Day., Disp: , Rfl:   •  methotrexate 2.5 MG tablet, Take  by mouth 3 (Three) Doses Each Week. Take Doses 12 (Twelve) Hours Apart., Disp: , Rfl:   •  ORENCIA 125 MG/ML solution prefilled syringe, , Disp: , Rfl:   •  PREMARIN 1.25 MG tablet, Take 1.25 mg by mouth Daily., Disp: , Rfl: 11  MEDICATION LIST AND ALLERGIES REVIEWED.    Family History   Problem Relation Age of Onset   • Diabetes Mother    • Diabetes Father    • Heart failure Father    • Diabetes Sister    • Diabetes Brother      Social History     Tobacco Use   • Smoking status: Never Smoker   • Smokeless tobacco: Never Used   Substance Use Topics   • Alcohol use: No     Frequency: Never   • Drug use: No     Social History     Social History Narrative        Retired teacher and artist    Lifelong non-smoker    Seldom drinks alcohol    No history of drug abuse     FAMILY AND SOCIAL  "HISTORY REVIEWED.    Review of Systems   Constitutional: Negative.  Negative for fatigue and unexpected weight change.   HENT: Positive for postnasal drip. Negative for rhinorrhea.    Respiratory: Positive for shortness of breath and wheezing. Negative for cough.    Cardiovascular: Negative.  Negative for chest pain, palpitations and leg swelling.   Gastrointestinal: Negative for abdominal distention and nausea.   Musculoskeletal: Positive for arthralgias and back pain. Negative for joint swelling.   Skin: Negative.  Negative for rash and wound.   Allergic/Immunologic: Negative.  Negative for immunocompromised state.   Hematological: Negative.  Negative for adenopathy.   Psychiatric/Behavioral: Positive for sleep disturbance.     ALSO REFER TO SCANNED ROS SHEET FROM SAME DATE.    /80   Pulse 83   Temp 97.1 °F (36.2 °C)   Ht 170 cm (66.93\")   Wt 125 kg (276 lb)   LMP  (LMP Unknown)   SpO2 95% Comment: resting at room air  BMI 43.32 kg/m²   Physical Exam  Vitals signs and nursing note reviewed.   Constitutional:       General: She is not in acute distress.     Appearance: She is well-developed. She is not diaphoretic.   HENT:      Head: Normocephalic and atraumatic.   Neck:      Thyroid: No thyromegaly.   Cardiovascular:      Rate and Rhythm: Normal rate and regular rhythm.      Heart sounds: Normal heart sounds. No murmur.   Pulmonary:      Effort: Pulmonary effort is normal.      Breath sounds: Normal breath sounds. No stridor.   Abdominal:      General: Bowel sounds are normal.      Palpations: Abdomen is soft.   Musculoskeletal: Normal range of motion.      Right lower leg: No edema.      Left lower leg: No edema.   Lymphadenopathy:      Cervical: No cervical adenopathy.      Upper Body:      Right upper body: No supraclavicular or epitrochlear adenopathy.      Left upper body: No supraclavicular or epitrochlear adenopathy.   Skin:     General: Skin is warm and dry.   Neurological:      Mental Status: " She is alert and oriented to person, place, and time.   Psychiatric:         Behavior: Behavior normal.         Results       Problem List       ICD-10-CM ICD-9-CM   1. Chronic persistent asthma  J45.909 493.90   2. Non-smoker  Z78.9 V49.89   3. Obesity, Class III, BMI 40-49.9 (morbid obesity) (CMS/[facility])  E66.01 278.01   4. YARA on CPAP  G47.33 327.23    Z99.89 V46.8   5. Rheumatoid arthritis  Z87.39 V13.4       Discussion     She appears relatively stable from a pulmonary standpoint.  She is going to remain on her current regimen which is Breo 200 with Arnuity 201 puff of each once a day.  Also, albuterol on an as-needed basis.    We might consider a biologic agent in the future although she has not qualified based on labs in the past.    I encouraged her to follow-up with Dr. Gardner.    I encouraged continued compliance with CPAP and supplemental oxygen.  Also efforts at regular exercise and weight loss.    I will plan to see her back in 4 months with repeat spirometry    Foreign Gray MD  Note electronically signed    CC: Josafat King MD

## 2020-11-05 ENCOUNTER — DOCUMENTATION (OUTPATIENT)
Dept: PULMONOLOGY | Facility: CLINIC | Age: 65
End: 2020-11-05

## 2021-02-25 DIAGNOSIS — Z23 IMMUNIZATION DUE: ICD-10-CM

## 2021-12-21 DIAGNOSIS — J45.909 IDIOPATHIC ASTHMA: Primary | ICD-10-CM

## 2021-12-21 RX ORDER — FLUTICASONE FUROATE 200 UG/1
POWDER RESPIRATORY (INHALATION)
Qty: 60 EACH | Refills: 0 | OUTPATIENT
Start: 2021-12-21

## 2021-12-21 RX ORDER — FLUTICASONE FUROATE 200 UG/1
200 POWDER RESPIRATORY (INHALATION) DAILY
Qty: 30 EACH | Refills: 0 | Status: SHIPPED | OUTPATIENT
Start: 2021-12-21 | End: 2022-03-08 | Stop reason: SDUPTHER

## 2022-03-08 DIAGNOSIS — J45.909 IDIOPATHIC ASTHMA: ICD-10-CM

## 2022-03-08 RX ORDER — FLUTICASONE FUROATE 200 UG/1
200 POWDER RESPIRATORY (INHALATION) DAILY
Qty: 30 EACH | Refills: 5 | Status: SHIPPED | OUTPATIENT
Start: 2022-03-08 | End: 2022-03-23 | Stop reason: SDUPTHER

## 2022-03-08 NOTE — TELEPHONE ENCOUNTER
Refilled Rx Breo 200 and Arnuity 200 per chart via fax sent to [facility] per pt's request.

## 2022-03-11 DIAGNOSIS — Z01.812 BLOOD TESTS PRIOR TO TREATMENT OR PROCEDURE: Primary | ICD-10-CM

## 2022-03-23 ENCOUNTER — OFFICE VISIT (OUTPATIENT)
Dept: PULMONOLOGY | Facility: CLINIC | Age: 67
End: 2022-03-23

## 2022-03-23 VITALS
SYSTOLIC BLOOD PRESSURE: 146 MMHG | TEMPERATURE: 97.5 F | BODY MASS INDEX: 44.41 KG/M2 | OXYGEN SATURATION: 96 % | HEART RATE: 74 BPM | WEIGHT: 293 LBS | HEIGHT: 68 IN | DIASTOLIC BLOOD PRESSURE: 82 MMHG | RESPIRATION RATE: 18 BRPM

## 2022-03-23 DIAGNOSIS — J45.909 IDIOPATHIC ASTHMA: ICD-10-CM

## 2022-03-23 DIAGNOSIS — Z78.9 NON-SMOKER: ICD-10-CM

## 2022-03-23 DIAGNOSIS — Z01.818 PREOPERATIVE CLEARANCE: Primary | ICD-10-CM

## 2022-03-23 DIAGNOSIS — G47.33 OSA ON CPAP: ICD-10-CM

## 2022-03-23 DIAGNOSIS — Z99.89 OSA ON CPAP: ICD-10-CM

## 2022-03-23 PROCEDURE — 94729 DIFFUSING CAPACITY: CPT | Performed by: NURSE PRACTITIONER

## 2022-03-23 PROCEDURE — 99214 OFFICE O/P EST MOD 30 MIN: CPT | Performed by: NURSE PRACTITIONER

## 2022-03-23 PROCEDURE — 94375 RESPIRATORY FLOW VOLUME LOOP: CPT | Performed by: NURSE PRACTITIONER

## 2022-03-23 PROCEDURE — 94726 PLETHYSMOGRAPHY LUNG VOLUMES: CPT | Performed by: NURSE PRACTITIONER

## 2022-03-23 RX ORDER — FLUTICASONE FUROATE 200 UG/1
200 POWDER RESPIRATORY (INHALATION) DAILY
Qty: 1 EACH | Refills: 11 | Status: SHIPPED | OUTPATIENT
Start: 2022-03-23 | End: 2022-10-18 | Stop reason: SDUPTHER

## 2022-03-23 NOTE — PROGRESS NOTES
"[facility] Pulmonary Follow up      Chief Complaint  Clearance for Surgery    Subjective          Amaya Brown presents to [facility] PULMONARY & CRITICAL CARE MEDICINE for preoperative clearance, right hip surgery, [facility], Dr Mann.  She had her left side at around 3 years ago and did very well.    We follow her here in the office for mild intermittent asthma.  She was last seen in September 2020.  Since then she has been doing fairly well.  She is currently on Breo and Arnuity daily.  She has very rare use of her rescue inhaler.  She has had no recent acute exacerbation or illness.  No use of antibiotics or steroids.    She does have obstructive sleep apnea and uses CPAP nightly.  She has a fullface mask and average around 6 to 7 hours a night.  Since she was here her primary care did an overnight oximetry study and was she was placed on 2 L with oxygen with her CPAP.    She also has rheumatoid arthritis and is currently holding her medications for surgery.  She is having some worsening joint pain.  She is off her methotrexate secondary to liver issues.        Objective     Vital Signs:   /82   Pulse 74   Temp 97.5 °F (36.4 °C)   Resp 18   Ht 172.7 cm (68\")   Wt (!) 136 kg (300 lb 6.4 oz)   SpO2 96%   BMI 45.68 kg/m²       Immunization History   Administered Date(s) Administered   • Flu Vaccine Intradermal Quad 18-64YR 02/02/2017, 10/11/2017, 10/25/2018   • Flu Vaccine Quad PF >36MO 10/09/2018   • Influenza, Unspecified 10/01/2019   • Tdap 10/09/2018   • flucelvax quad pfs =>4 YRS 09/27/2019       Physical Exam  Vitals reviewed.   Constitutional:       General: She is not in acute distress.     Appearance: She is well-developed. She is obese. She is not ill-appearing.   HENT:      Head: Normocephalic and atraumatic.   Eyes:      Pupils: Pupils are equal, round, and reactive to light.   Cardiovascular:      Rate and Rhythm: Normal rate and regular rhythm.      Heart sounds: " No murmur heard.  Pulmonary:      Effort: Pulmonary effort is normal. No respiratory distress.      Breath sounds: Normal breath sounds. No wheezing or rales.   Abdominal:      General: Bowel sounds are normal. There is no distension.      Palpations: Abdomen is soft.   Musculoskeletal:         General: Normal range of motion.      Cervical back: Normal range of motion and neck supple.   Skin:     General: Skin is warm and dry.      Findings: No erythema.   Neurological:      Mental Status: She is alert and oriented to person, place, and time.   Psychiatric:         Behavior: Behavior normal.          Result Review :            PFTs done in the office today:  Mild obstruction with an FEV1 of 1.88, 70% predicted FVC was normal at 3.05 L, 87% predicted.  Some evidence of air trapping with elevated residual volume normal adjusted DLCO.    PA and lateral chest x-ray done the office today reviewed by me  Awaiting final MD interpretation                 Assessment and Plan    Problem List Items Addressed This Visit        Pulmonary and Pneumonias    Chronic persistent asthma    Relevant Medications    Fluticasone Furoate-Vilanterol (Breo Ellipta) 200-25 MCG/INH inhaler    Fluticasone Furoate (Arnuity Ellipta) 200 MCG/ACT       Sleep    YARA on CPAP       Tobacco    Non-smoker      Other Visit Diagnoses     Preoperative clearance    -  Primary    Relevant Orders    XR Chest PA & Lateral (Completed)    Pulmonary Function Test (Completed)        Ms. Hebert is here today for preoperative clearance.  She is having a hip surgery done at [facility] and will be admitted for observation.    She will continue to follow-up with her primary care for her obstructive sleep apnea with her CPAP and oxygen at night.  We did discuss how sleep apnea with hypoxemia can alter her postoperative saturations while she is waking up from anesthesia.  She did have trouble with that during her last surgery 3 years ago as well.    Her asthma seems  well controlled on the Breo and Arnuity.      ARISCAT Preoperative Pulmonary Risk Index.    Age []   <= 50 years old (0 points)    [x]   51-80 years old (3 points)    []   >80 years old (16 points)       Preoperative Oxygen Saturation [x]   >= 96% (0 points)    []   91-95% (8 points)    []   <= 90% (24 points)       Other Clinical Risk Factors []   Respiratory Infection in the last month (17 points)    []   Pre-operative anemia: Hb < 10 g/dL (11 points)    []   Emergency Surgery (8 points)       Surgical Incision []   Upper abdominal (15 points)    []   Intrathoracic (24 points)       Duration of Surgery [x]   < 2 hours (0 points)    []   2-3 hours (16 points)    []   >3 hours (23 points)       Total Criteria Point Count: 3     ARISCAT risk index interpretation:      0-25 points: Low risk  1.6 % pulmonary complication rate.   26-44 points: Intermediate risk 13.3% pulmonary complication rate.    points: High risk 42.1 % pulmonary complication rate.     Postoperative Pulmonary Complications include but are not limited to: Respiratory Failure, Pulmonary Infection, Pleural Effusion, Atelectasis, Pneumothorax Bronchospasm, Aspiration Pneumonia.        Follow Up     Return in about 6 months (around 9/23/2022).  Patient was given instructions and counseling regarding her condition or for health maintenance advice. Please see specific information pulled into the AVS if appropriate.     I spent 35 minutes caring for Amaya on this date of service. This time includes time spent by me in the following activities:preparing for the visit, reviewing tests, obtaining and/or reviewing a separately obtained history, performing a medically appropriate examination and/or evaluation , counseling and educating the patient/family/caregiver, ordering medications, tests, or procedures, referring and communicating with other health care professionals , documenting information in the medical record and independently interpreting  results and communicating that information with the patient/family/caregiver    Moderate level of Medical Decision Making complexity based on 2 or more chronic stable illnesses and prescription drug management.    NEHEMIAS Olivo, ACNP  [facility] Pulmonary Critical Care Medicine  Electronically signed

## 2022-10-18 ENCOUNTER — TELEPHONE (OUTPATIENT)
Dept: PULMONOLOGY | Facility: CLINIC | Age: 67
End: 2022-10-18

## 2022-10-18 DIAGNOSIS — J45.909 IDIOPATHIC ASTHMA: ICD-10-CM

## 2022-10-18 RX ORDER — FLUTICASONE FUROATE 200 UG/1
200 POWDER RESPIRATORY (INHALATION) DAILY
Qty: 1 EACH | Refills: 11 | Status: SHIPPED | OUTPATIENT
Start: 2022-10-18 | End: 2022-10-25

## 2022-10-25 ENCOUNTER — OFFICE VISIT (OUTPATIENT)
Dept: PULMONOLOGY | Facility: CLINIC | Age: 67
End: 2022-10-25

## 2022-10-25 VITALS
WEIGHT: 293 LBS | OXYGEN SATURATION: 95 % | HEART RATE: 76 BPM | DIASTOLIC BLOOD PRESSURE: 80 MMHG | HEIGHT: 68 IN | TEMPERATURE: 98.1 F | SYSTOLIC BLOOD PRESSURE: 122 MMHG | BODY MASS INDEX: 44.41 KG/M2

## 2022-10-25 DIAGNOSIS — G47.33 OSA ON CPAP: ICD-10-CM

## 2022-10-25 DIAGNOSIS — J45.909 IDIOPATHIC ASTHMA: Primary | ICD-10-CM

## 2022-10-25 DIAGNOSIS — Z87.39 HISTORY OF RHEUMATOID ARTHRITIS: ICD-10-CM

## 2022-10-25 DIAGNOSIS — E66.01 OBESITY, CLASS III, BMI 40-49.9 (MORBID OBESITY): ICD-10-CM

## 2022-10-25 DIAGNOSIS — Z78.9 NON-SMOKER: ICD-10-CM

## 2022-10-25 DIAGNOSIS — Z99.89 OSA ON CPAP: ICD-10-CM

## 2022-10-25 PROCEDURE — 99214 OFFICE O/P EST MOD 30 MIN: CPT | Performed by: INTERNAL MEDICINE

## 2022-10-25 RX ORDER — ALBUTEROL SULFATE 90 UG/1
2 AEROSOL, METERED RESPIRATORY (INHALATION) EVERY 6 HOURS PRN
Qty: 18 G | Refills: 11 | Status: SHIPPED | OUTPATIENT
Start: 2022-10-25

## 2022-10-25 RX ORDER — FLUTICASONE FUROATE, UMECLIDINIUM BROMIDE AND VILANTEROL TRIFENATATE 200; 62.5; 25 UG/1; UG/1; UG/1
1 POWDER RESPIRATORY (INHALATION) DAILY
Qty: 1 EACH | Refills: 11 | Status: SHIPPED | OUTPATIENT
Start: 2022-10-25

## 2022-10-25 RX ORDER — METFORMIN HYDROCHLORIDE 500 MG/1
TABLET, EXTENDED RELEASE ORAL
COMMUNITY
Start: 2022-09-14

## 2022-10-25 RX ORDER — SERTRALINE HYDROCHLORIDE 100 MG/1
100 TABLET, FILM COATED ORAL DAILY
COMMUNITY
Start: 2022-09-15

## 2022-10-25 NOTE — PROGRESS NOTES
PULMONARY  NOTE    Chief Complaint     Chronic persistent asthma, non-smoker, rheumatoid arthritis, class III obesity, YARA and nocturnal hypoxemia    History of Present Illness     67-year-old female returns today for follow-up  I last saw her 9/25/2020    She is a lifelong non-smoker with chronic obstructive airways disease  She has moderate airway obstruction on prior spirometry  She had been on high-dose ICS with Breo 200+ Arnuity  Insurance changed and these inhalers are now expensive    She has had no recent acute exacerbation of asthma    She has YARA and nocturnal hypoxemia  She remains on CPAP with supplemental oxygen at night    She has rheumatoid arthritis and remains on Plaquenil with Orencia  She is getting her eyes checked periodically    Patient Active Problem List   Diagnosis   • Chronic persistent asthma   • Non-smoker   • Obesity, Class III, BMI 40-49.9 (morbid obesity) ([facility])   • YARA on CPAP   • Rheumatoid arthritis     Allergies   Allergen Reactions   • Adalimumab Hives   • Benoxinate Other (See Comments)       Current Outpatient Medications:   •  albuterol sulfate  (90 Base) MCG/ACT inhaler, ProAir HFA 90 mcg/actuation aerosol inhaler, Disp: , Rfl:   •  bisoprolol-hydrochlorothiazide (ZIAC) 5-6.25 MG per tablet, Take 1 tablet by mouth Daily., Disp: , Rfl: 2  •  cetirizine (zyrTEC) 10 MG tablet, Take 10 mg by mouth Daily., Disp: , Rfl:   •  hydroxychloroquine (PLAQUENIL) 200 MG tablet, Take 200 mg by mouth 2 (Two) Times a Day., Disp: , Rfl: 1  •  Influenza Vac Subunit Quad (FLUCELVAX) 0.5 ML suspension prefilled syringe injection, Flucelvax Quad 4012-4212 (PF) 60 mcg (15 mcg x 4)/0.5 mL IM syringe, Disp: , Rfl:   •  metFORMIN ER (GLUCOPHAGE-XR) 500 MG 24 hr tablet, TAKE 1 TABLET BY MOUTH ONCE DAILY WITH EVENING MEAL, Disp: , Rfl:   •  methotrexate 2.5 MG tablet, Take  by mouth 3 (Three) Doses Each Week. Take Doses 12 (Twelve) Hours Apart., Disp: , Rfl:   •  montelukast (SINGULAIR) 10 MG  "tablet, Take 10 mg by mouth Every Evening., Disp: , Rfl: 5  •  Multiple Vitamins-Minerals (MULTIVITAMIN WITH MINERALS) tablet tablet, Take 1 tablet by mouth Daily., Disp: , Rfl:   •  naproxen (NAPROSYN) 500 MG tablet, naproxen 500 mg tablet, Disp: , Rfl:   •  ORENCIA 125 MG/ML solution prefilled syringe, , Disp: , Rfl:   •  sertraline (ZOLOFT) 100 MG tablet, Take 1 tablet by mouth Daily., Disp: , Rfl:   MEDICATION LIST AND ALLERGIES REVIEWED.    Family History   Problem Relation Age of Onset   • Diabetes Mother    • Diabetes Father    • Heart failure Father    • Diabetes Sister    • Diabetes Brother      Social History     Tobacco Use   • Smoking status: Never   • Smokeless tobacco: Never   Substance Use Topics   • Alcohol use: No   • Drug use: No     Social History     Social History Narrative        Retired teacher and artist    Lifelong non-smoker    Seldom drinks alcohol    No history of drug abuse     FAMILY AND SOCIAL HISTORY REVIEWED.    Review of Systems  ALSO REFER TO SCANNED ROS SHEET FROM SAME DATE.    /80   Pulse 76   Temp 98.1 °F (36.7 °C)   Ht 172.7 cm (68\")   Wt (!) 138 kg (305 lb)   LMP  (LMP Unknown)   SpO2 95% Comment: resting, room air  BMI 46.38 kg/m²   Physical Exam  Vitals and nursing note reviewed.   Constitutional:       General: She is not in acute distress.     Appearance: She is well-developed. She is not diaphoretic.   HENT:      Head: Normocephalic and atraumatic.   Neck:      Thyroid: No thyromegaly.   Cardiovascular:      Rate and Rhythm: Normal rate and regular rhythm.      Heart sounds: Normal heart sounds. No murmur heard.  Pulmonary:      Effort: Pulmonary effort is normal.      Breath sounds: Normal breath sounds. No stridor.   Lymphadenopathy:      Cervical: No cervical adenopathy.      Upper Body:      Right upper body: No supraclavicular or epitrochlear adenopathy.      Left upper body: No supraclavicular or epitrochlear adenopathy.   Skin:     General: Skin " is warm and dry.   Neurological:      Mental Status: She is alert and oriented to person, place, and time.   Psychiatric:         Behavior: Behavior normal.         Results     Immunization History   Administered Date(s) Administered   • COVID-19 (MODERNA) 1st, 2nd, 3rd Dose Only 02/24/2021, 03/24/2021, 09/15/2021, 08/18/2022   • Flu Vaccine Intradermal Quad 18-64YR 02/02/2017, 10/11/2017, 10/25/2018   • Flu Vaccine Quad PF >36MO 10/09/2018   • Fluzone High-Dose 65+yrs 11/08/2021   • Influenza, Unspecified 02/02/2017, 10/11/2017, 10/01/2019   • Tdap 10/09/2018   • flucelvax quad pfs =>4 YRS 09/27/2019     Problem List       ICD-10-CM ICD-9-CM   1. Chronic persistent asthma  J45.909 493.90   2. Non-smoker  Z78.9 V49.89   3. Obesity, Class III, BMI 40-49.9 (morbid obesity) ([facility])  E66.01 278.01   4. YARA on CPAP  G47.33 327.23    Z99.89 V46.8   5. Rheumatoid arthritis  Z87.39 V13.4       Discussion     Stable with no exacerbation of asthma recently.    Medication cost is now an issue  Checking our computer it appears that Trelegy might be better covered  Gave her samples, written instructions, and a demonstration of use  I have asked her to check the cost and get back to us with that and I could be covered    I encourage continued use of CPAP and supplemental oxygen at night    I will plan to see her back in 6 months with full PFTs    Moderate level of Medical Decision Making complexity based on 2 or more chronic stable illnesses and prescription drug management.    Foreign Gray MD  Note electronically signed    CC: Josafat King MD

## 2023-09-30 ENCOUNTER — APPOINTMENT (OUTPATIENT)
Dept: CT IMAGING | Facility: HOSPITAL | Age: 68
End: 2023-09-30
Payer: MEDICARE

## 2023-09-30 ENCOUNTER — HOSPITAL ENCOUNTER (EMERGENCY)
Facility: HOSPITAL | Age: 68
Discharge: HOME | End: 2023-09-30
Attending: EMERGENCY MEDICINE
Payer: MEDICARE

## 2023-09-30 VITALS
HEART RATE: 92 BPM | DIASTOLIC BLOOD PRESSURE: 62 MMHG | SYSTOLIC BLOOD PRESSURE: 131 MMHG | RESPIRATION RATE: 16 BRPM | BODY MASS INDEX: 42.38 KG/M2 | TEMPERATURE: 98.4 F | WEIGHT: 270 LBS | OXYGEN SATURATION: 96 % | HEIGHT: 67 IN

## 2023-09-30 DIAGNOSIS — N13.2 URETERAL STONE WITH HYDRONEPHROSIS: Primary | ICD-10-CM

## 2023-09-30 LAB
ALBUMIN SERPL-MCNC: 4 G/DL (ref 3.5–5.2)
ALBUMIN/GLOB SERPL: 1.3 G/DL
ALP SERPL-CCNC: 54 U/L (ref 39–117)
ALT SERPL W P-5'-P-CCNC: 73 U/L (ref 1–33)
ANION GAP SERPL CALCULATED.3IONS-SCNC: 14.4 MMOL/L (ref 5–15)
AST SERPL-CCNC: 60 U/L (ref 1–32)
BACTERIA UR QL AUTO: ABNORMAL /HPF
BASOPHILS # BLD AUTO: 0.06 10*3/MM3 (ref 0–0.2)
BASOPHILS NFR BLD AUTO: 0.4 % (ref 0–1.5)
BILIRUB SERPL-MCNC: 0.4 MG/DL (ref 0–1.2)
BILIRUB UR QL STRIP: NEGATIVE
BUN SERPL-MCNC: 12 MG/DL (ref 8–23)
BUN/CREAT SERPL: 14.8 (ref 7–25)
CALCIUM SPEC-SCNC: 9.2 MG/DL (ref 8.6–10.5)
CHLORIDE SERPL-SCNC: 104 MMOL/L (ref 98–107)
CLARITY UR: ABNORMAL
CO2 SERPL-SCNC: 21.6 MMOL/L (ref 22–29)
COLOR UR: YELLOW
CREAT SERPL-MCNC: 0.81 MG/DL (ref 0.57–1)
D-LACTATE SERPL-SCNC: 1.7 MMOL/L (ref 0.5–2)
DEPRECATED RDW RBC AUTO: 44.6 FL (ref 37–54)
EGFRCR SERPLBLD CKD-EPI 2021: 79.7 ML/MIN/1.73
EOSINOPHIL # BLD AUTO: 0.08 10*3/MM3 (ref 0–0.4)
EOSINOPHIL NFR BLD AUTO: 0.5 % (ref 0.3–6.2)
ERYTHROCYTE [DISTWIDTH] IN BLOOD BY AUTOMATED COUNT: 13.6 % (ref 12.3–15.4)
GLOBULIN UR ELPH-MCNC: 3.1 GM/DL
GLUCOSE SERPL-MCNC: 183 MG/DL (ref 65–99)
GLUCOSE UR STRIP-MCNC: ABNORMAL MG/DL
HCT VFR BLD AUTO: 44.7 % (ref 34–46.6)
HGB BLD-MCNC: 14.2 G/DL (ref 12–15.9)
HGB UR QL STRIP.AUTO: ABNORMAL
HOLD SPECIMEN: NORMAL
HOLD SPECIMEN: NORMAL
HYALINE CASTS UR QL AUTO: ABNORMAL /LPF
IMM GRANULOCYTES # BLD AUTO: 0.05 10*3/MM3 (ref 0–0.05)
IMM GRANULOCYTES NFR BLD AUTO: 0.3 % (ref 0–0.5)
KETONES UR QL STRIP: NEGATIVE
LEUKOCYTE ESTERASE UR QL STRIP.AUTO: ABNORMAL
LYMPHOCYTES # BLD AUTO: 0.64 10*3/MM3 (ref 0.7–3.1)
LYMPHOCYTES NFR BLD AUTO: 4.3 % (ref 19.6–45.3)
MCH RBC QN AUTO: 28.5 PG (ref 26.6–33)
MCHC RBC AUTO-ENTMCNC: 31.8 G/DL (ref 31.5–35.7)
MCV RBC AUTO: 89.6 FL (ref 79–97)
MONOCYTES # BLD AUTO: 1.27 10*3/MM3 (ref 0.1–0.9)
MONOCYTES NFR BLD AUTO: 8.6 % (ref 5–12)
NEUTROPHILS NFR BLD AUTO: 12.72 10*3/MM3 (ref 1.7–7)
NEUTROPHILS NFR BLD AUTO: 85.9 % (ref 42.7–76)
NITRITE UR QL STRIP: POSITIVE
NRBC BLD AUTO-RTO: 0 /100 WBC (ref 0–0.2)
PH UR STRIP.AUTO: <=5 [PH] (ref 5–8)
PLATELET # BLD AUTO: 206 10*3/MM3 (ref 140–450)
PMV BLD AUTO: 9.4 FL (ref 6–12)
POTASSIUM SERPL-SCNC: 4.1 MMOL/L (ref 3.5–5.2)
PROT SERPL-MCNC: 7.1 G/DL (ref 6–8.5)
PROT UR QL STRIP: ABNORMAL
RBC # BLD AUTO: 4.99 10*6/MM3 (ref 3.77–5.28)
RBC # UR STRIP: ABNORMAL /HPF
REF LAB TEST METHOD: ABNORMAL
SODIUM SERPL-SCNC: 140 MMOL/L (ref 136–145)
SP GR UR STRIP: 1.03 (ref 1–1.03)
SQUAMOUS #/AREA URNS HPF: ABNORMAL /HPF
UROBILINOGEN UR QL STRIP: ABNORMAL
WBC # UR STRIP: ABNORMAL /HPF
WBC CLUMPS # UR AUTO: ABNORMAL /HPF
WBC NRBC COR # BLD: 14.82 10*3/MM3 (ref 3.4–10.8)
WHOLE BLOOD HOLD COAG: NORMAL
WHOLE BLOOD HOLD SPECIMEN: NORMAL

## 2023-09-30 PROCEDURE — 25010000002 MORPHINE PER 10 MG: Performed by: EMERGENCY MEDICINE

## 2023-09-30 PROCEDURE — 87186 SC STD MICRODIL/AGAR DIL: CPT | Performed by: PHYSICIAN ASSISTANT

## 2023-09-30 PROCEDURE — 74176 CT ABD & PELVIS W/O CONTRAST: CPT | Performed by: RADIOLOGY

## 2023-09-30 PROCEDURE — 87077 CULTURE AEROBIC IDENTIFY: CPT | Performed by: PHYSICIAN ASSISTANT

## 2023-09-30 PROCEDURE — 36415 COLL VENOUS BLD VENIPUNCTURE: CPT

## 2023-09-30 PROCEDURE — 74176 CT ABD & PELVIS W/O CONTRAST: CPT

## 2023-09-30 PROCEDURE — 25010000002 CEFTRIAXONE PER 250 MG: Performed by: PHYSICIAN ASSISTANT

## 2023-09-30 PROCEDURE — 99284 EMERGENCY DEPT VISIT MOD MDM: CPT

## 2023-09-30 PROCEDURE — 83605 ASSAY OF LACTIC ACID: CPT | Performed by: PHYSICIAN ASSISTANT

## 2023-09-30 PROCEDURE — 85025 COMPLETE CBC W/AUTO DIFF WBC: CPT | Performed by: PHYSICIAN ASSISTANT

## 2023-09-30 PROCEDURE — 96375 TX/PRO/DX INJ NEW DRUG ADDON: CPT

## 2023-09-30 PROCEDURE — 96365 THER/PROPH/DIAG IV INF INIT: CPT

## 2023-09-30 PROCEDURE — 80053 COMPREHEN METABOLIC PANEL: CPT | Performed by: PHYSICIAN ASSISTANT

## 2023-09-30 PROCEDURE — 87086 URINE CULTURE/COLONY COUNT: CPT | Performed by: PHYSICIAN ASSISTANT

## 2023-09-30 PROCEDURE — 87040 BLOOD CULTURE FOR BACTERIA: CPT | Performed by: PHYSICIAN ASSISTANT

## 2023-09-30 PROCEDURE — 81001 URINALYSIS AUTO W/SCOPE: CPT | Performed by: PHYSICIAN ASSISTANT

## 2023-09-30 RX ORDER — CEFDINIR 300 MG/1
300 CAPSULE ORAL 2 TIMES DAILY
Qty: 20 CAPSULE | Refills: 0 | Status: SHIPPED | OUTPATIENT
Start: 2023-09-30 | End: 2023-10-10

## 2023-09-30 RX ORDER — SODIUM CHLORIDE 0.9 % (FLUSH) 0.9 %
10 SYRINGE (ML) INJECTION AS NEEDED
Status: DISCONTINUED | OUTPATIENT
Start: 2023-09-30 | End: 2023-09-30 | Stop reason: HOSPADM

## 2023-09-30 RX ORDER — ACETAMINOPHEN 500 MG
1000 TABLET ORAL ONCE
Status: COMPLETED | OUTPATIENT
Start: 2023-09-30 | End: 2023-09-30

## 2023-09-30 RX ORDER — OXYCODONE AND ACETAMINOPHEN 7.5; 325 MG/1; MG/1
1 TABLET ORAL EVERY 6 HOURS PRN
Qty: 12 TABLET | Refills: 0 | Status: SHIPPED | OUTPATIENT
Start: 2023-09-30

## 2023-09-30 RX ORDER — ONDANSETRON 4 MG/1
4 TABLET, ORALLY DISINTEGRATING ORAL EVERY 6 HOURS PRN
Qty: 12 TABLET | Refills: 0 | Status: SHIPPED | OUTPATIENT
Start: 2023-09-30

## 2023-09-30 RX ORDER — TAMSULOSIN HYDROCHLORIDE 0.4 MG/1
1 CAPSULE ORAL DAILY
Qty: 30 CAPSULE | Refills: 0 | Status: SHIPPED | OUTPATIENT
Start: 2023-09-30

## 2023-09-30 RX ADMIN — CEFTRIAXONE SODIUM 2000 MG: 2 INJECTION, POWDER, FOR SOLUTION INTRAMUSCULAR; INTRAVENOUS at 14:20

## 2023-09-30 RX ADMIN — MORPHINE SULFATE 4 MG: 4 INJECTION, SOLUTION INTRAMUSCULAR; INTRAVENOUS at 15:00

## 2023-09-30 RX ADMIN — SODIUM CHLORIDE 1000 ML: 9 INJECTION, SOLUTION INTRAVENOUS at 13:32

## 2023-09-30 RX ADMIN — ACETAMINOPHEN 1000 MG: 500 TABLET ORAL at 13:00

## 2023-09-30 NOTE — ED PROVIDER NOTES
Subjective   History of Present Illness  67 yo female pt presents to the ED with complaints of left flank pain, fever, chills, and hematuria x 1 day.  Pt states that she felt feverish but did not check her temperature. Pt states she has had pink tinged urine for about a week. Pt states that the pain hit this morning. Pt denies any worsening or alleviating factors.  Pt states that she has a hx of kidney stones but hurt worse than this. Denies any n/v/d, CP, or SOB.     History provided by:  Patient   used: No      Review of Systems   Constitutional: Negative.    HENT: Negative.     Eyes: Negative.    Respiratory: Negative.     Cardiovascular: Negative.    Gastrointestinal: Negative.    Endocrine: Negative.    Genitourinary:  Positive for flank pain and hematuria.   Skin: Negative.    Allergic/Immunologic: Negative.    Neurological: Negative.    Hematological: Negative.    Psychiatric/Behavioral: Negative.     All other systems reviewed and are negative.    Past Medical History:   Diagnosis Date    Asthma, extrinsic     Rheumatoid arthritis        Allergies   Allergen Reactions    Benzoyl Peroxide Hives    Humira [Adalimumab] Hives       Past Surgical History:   Procedure Laterality Date    CARPAL TUNNEL RELEASE      GALLBLADDER SURGERY      HIP BIPOLAR REPLACEMENT      HYSTERECTOMY         Family History   Problem Relation Age of Onset    Diabetes Mother     Diabetes Father     Heart failure Father     Diabetes Sister     Diabetes Brother        Social History     Socioeconomic History    Marital status:    Tobacco Use    Smoking status: Never    Smokeless tobacco: Never   Substance and Sexual Activity    Alcohol use: No    Drug use: No    Sexual activity: Defer           Objective   Physical Exam  Vitals and nursing note reviewed.   Constitutional:       Appearance: Normal appearance. She is normal weight.   HENT:      Head: Normocephalic and atraumatic.      Right Ear: External ear  normal.      Left Ear: External ear normal.      Nose: Nose normal.      Mouth/Throat:      Mouth: Mucous membranes are moist.      Pharynx: Oropharynx is clear.   Eyes:      Extraocular Movements: Extraocular movements intact.      Conjunctiva/sclera: Conjunctivae normal.      Pupils: Pupils are equal, round, and reactive to light.   Cardiovascular:      Rate and Rhythm: Normal rate and regular rhythm.      Pulses: Normal pulses.      Heart sounds: Normal heart sounds.   Pulmonary:      Effort: Pulmonary effort is normal.      Breath sounds: Normal breath sounds.   Abdominal:      General: Abdomen is flat. Bowel sounds are normal.      Palpations: Abdomen is soft.      Tenderness: There is left CVA tenderness.   Musculoskeletal:         General: Normal range of motion.      Cervical back: Normal range of motion and neck supple.   Skin:     General: Skin is warm and dry.      Capillary Refill: Capillary refill takes less than 2 seconds.   Neurological:      General: No focal deficit present.      Mental Status: She is alert and oriented to person, place, and time. Mental status is at baseline.   Psychiatric:         Mood and Affect: Mood normal.         Behavior: Behavior normal.         Thought Content: Thought content normal.         Judgment: Judgment normal.       Procedures           ED Course  ED Course as of 09/30/23 1810   Sat Sep 30, 2023   1535 CT Abdomen Pelvis Stone Protocol     IMPRESSION:     1.  There is a 7 x 14.3 mm stone at the left UPJ with associated partial  left renal obstruction.  2.  Bilateral nephrolithiasis.  3.  Cholecystectomy.  4.  Fatty infiltration of the liver.  5.  Normal appendix.  6.  Hysterectomy.  7.  Degenerative disc disease in the lumbar spine and lower thoracic  spine.  8.  No free fluid or free air.     This report was finalized on 9/30/2023 2:40 PM by Arnulfo Arellano MD.           Specimen Collected: 09/30/23 14:36 EDT Last Resulted: 09/30/23 14:40 EDT         [ML]   [id number]  Discussed case with Dr. Major.   [ML]   1700 PT reports she is pain free.  She will f/u with Dr. Helton on Monday. Discussed sx and red flags that would warrant return to the ED.   [ML]      ED Course User Index  [ML] Vaishali Lacy PA                CT Abdomen Pelvis Stone Protocol    Result Date: 9/30/2023   1.  There is a 7 x 14.3 mm stone at the left UPJ with associated partial left renal obstruction. 2.  Bilateral nephrolithiasis. 3.  Cholecystectomy. 4.  Fatty infiltration of the liver. 5.  Normal appendix. 6.  Hysterectomy. 7.  Degenerative disc disease in the lumbar spine and lower thoracic spine. 8.  No free fluid or free air.  This report was finalized on 9/30/2023 2:40 PM by Arnulfo Arellano MD.     Results for orders placed or performed during the hospital encounter of 09/30/23   Comprehensive Metabolic Panel    Specimen: Blood   Result Value Ref Range    Glucose 183 (H) 65 - 99 mg/dL    BUN 12 8 - 23 mg/dL    Creatinine 0.81 0.57 - 1.00 mg/dL    Sodium 140 136 - 145 mmol/L    Potassium 4.1 3.5 - 5.2 mmol/L    Chloride 104 98 - 107 mmol/L    CO2 21.6 (L) 22.0 - 29.0 mmol/L    Calcium 9.2 8.6 - 10.5 mg/dL    Total Protein 7.1 6.0 - 8.5 g/dL    Albumin 4.0 3.5 - 5.2 g/dL    ALT (SGPT) 73 (H) 1 - 33 U/L    AST (SGOT) 60 (H) 1 - 32 U/L    Alkaline Phosphatase 54 39 - 117 U/L    Total Bilirubin 0.4 0.0 - 1.2 mg/dL    Globulin 3.1 gm/dL    A/G Ratio 1.3 g/dL    BUN/Creatinine Ratio 14.8 7.0 - 25.0    Anion Gap 14.4 5.0 - 15.0 mmol/L    eGFR 79.7 >60.0 mL/min/1.73   Urinalysis With Microscopic If Indicated (No Culture) - Urine, Clean Catch    Specimen: Urine, Clean Catch   Result Value Ref Range    Color, UA Yellow Yellow, Straw    Appearance, UA Cloudy (A) Clear    pH, UA <=5.0 5.0 - 8.0    Specific Gravity, UA 1.028 1.005 - 1.030    Glucose, UA >=1000 mg/dL (3+) (A) Negative    Ketones, UA Negative Negative    Bilirubin, UA Negative Negative    Blood, UA Large (3+) (A) Negative    Protein,  mg/dL  (2+) (A) Negative    Leuk Esterase, UA Moderate (2+) (A) Negative    Nitrite, UA Positive (A) Negative    Urobilinogen, UA 0.2 E.U./dL 0.2 - 1.0 E.U./dL   CBC Auto Differential    Specimen: Blood   Result Value Ref Range    WBC 14.82 (H) 3.40 - 10.80 10*3/mm3    RBC 4.99 3.77 - 5.28 10*6/mm3    Hemoglobin 14.2 12.0 - 15.9 g/dL    Hematocrit 44.7 34.0 - 46.6 %    MCV 89.6 79.0 - 97.0 fL    MCH 28.5 26.6 - 33.0 pg    MCHC 31.8 31.5 - 35.7 g/dL    RDW 13.6 12.3 - 15.4 %    RDW-SD 44.6 37.0 - 54.0 fl    MPV 9.4 6.0 - 12.0 fL    Platelets 206 140 - 450 10*3/mm3    Neutrophil % 85.9 (H) 42.7 - 76.0 %    Lymphocyte % 4.3 (L) 19.6 - 45.3 %    Monocyte % 8.6 5.0 - 12.0 %    Eosinophil % 0.5 0.3 - 6.2 %    Basophil % 0.4 0.0 - 1.5 %    Immature Grans % 0.3 0.0 - 0.5 %    Neutrophils, Absolute 12.72 (H) 1.70 - 7.00 10*3/mm3    Lymphocytes, Absolute 0.64 (L) 0.70 - 3.10 10*3/mm3    Monocytes, Absolute 1.27 (H) 0.10 - 0.90 10*3/mm3    Eosinophils, Absolute 0.08 0.00 - 0.40 10*3/mm3    Basophils, Absolute 0.06 0.00 - 0.20 10*3/mm3    Immature Grans, Absolute 0.05 0.00 - 0.05 10*3/mm3    nRBC 0.0 0.0 - 0.2 /100 WBC   Urinalysis, Microscopic Only - Urine, Clean Catch    Specimen: Urine, Clean Catch   Result Value Ref Range    RBC, UA 13-20 (A) None Seen, 0-2 /HPF    WBC, UA Too Numerous to Count (A) None Seen, 0-2 /HPF    Bacteria, UA 2+ (A) None Seen /HPF    Squamous Epithelial Cells, UA None Seen None Seen, 0-2 /HPF    Hyaline Casts, UA None Seen None Seen /LPF    WBC Clumps, UA Small/1+ None Seen /HPF    Methodology Manual Light Microscopy    Lactic Acid, Plasma    Specimen: Blood   Result Value Ref Range    Lactate 1.7 0.5 - 2.0 mmol/L   Green Top (Gel)   Result Value Ref Range    Extra Tube Hold for add-ons.    Lavender Top   Result Value Ref Range    Extra Tube hold for add-on    Gold Top - SST   Result Value Ref Range    Extra Tube Hold for add-ons.    Light Blue Top   Result Value Ref Range    Extra Tube Hold for add-ons.                                  Medical Decision Making  69 yo female pt presents to the ED with complaints of left flank pain, fever, chills, and hematuria x 1 day.  Pt states that she felt feverish but did not check her temperature. Pt states she has had pink tinged urine for about a week. Pt states that the pain hit this morning. Pt denies any worsening or alleviating factors.  Pt states that she has a hx of kidney stones but hurt worse than this. Denies any n/v/d, CP, or SOB.  Pt feeling better. She is completely pain free. I have discussed case with Dr. Major who recommends dc home with outpatient urology f/u . Pt educated.  Discussed sx and red flags that would warrant return to the ED.      Problems Addressed:  Ureteral stone with hydronephrosis: complicated acute illness or injury    Amount and/or Complexity of Data Reviewed  Labs: ordered.  Radiology: ordered. Decision-making details documented in ED Course.    Risk  OTC drugs.  Prescription drug management.        Final diagnoses:   Ureteral stone with hydronephrosis       ED Disposition  ED Disposition       ED Disposition   Discharge    Condition   Stable    Comment   --               Josafat King MD  [address] [facility] [place] [address] [phone]    Schedule an appointment as soon as possible for a visit in 2 days      Prudencio Helton MD  [address] [facility] [address]  [place] [id number] [phone]    Schedule an appointment as soon as possible for a visit in 2 days           Medication List        New Prescriptions      cefdinir 300 MG capsule  Commonly known as: OMNICEF  Take 1 capsule by mouth 2 (Two) Times a Day for 10 days.     ondansetron ODT 4 MG disintegrating tablet  Commonly known as: ZOFRAN-ODT  Place 1 tablet on the tongue Every 6 (Six) Hours As Needed for Nausea or Vomiting.     oxyCODONE-acetaminophen 7.5-325 MG per tablet  Commonly known as: PERCOCET  Take 1 tablet by mouth Every 6 (Six) Hours As Needed for Moderate Pain.      tamsulosin 0.4 MG capsule 24 hr capsule  Commonly known as: FLOMAX  Take 1 capsule by mouth Daily.               Where to Get Your Medications        These medications were sent to [facility] [address] [place] [address] - [address] [place] [address] - [phone]  - [phone]   [address] [facility] [address]      Phone: [phone]   cefdinir 300 MG capsule  ondansetron ODT 4 MG disintegrating tablet  oxyCODONE-acetaminophen 7.5-325 MG per tablet  tamsulosin 0.4 MG capsule 24 hr capsule            Vaishali Lacy PA  09/30/23 2631

## 2023-10-02 ENCOUNTER — OFFICE VISIT (OUTPATIENT)
Dept: UROLOGY | Facility: CLINIC | Age: 68
End: 2023-10-02
Payer: MEDICARE

## 2023-10-02 VITALS — BODY MASS INDEX: 42.53 KG/M2 | HEIGHT: 67 IN | WEIGHT: 271 LBS

## 2023-10-02 DIAGNOSIS — N20.0 RENAL CALCULUS, LEFT: ICD-10-CM

## 2023-10-02 DIAGNOSIS — R10.9 FLANK PAIN: Primary | ICD-10-CM

## 2023-10-02 LAB
BILIRUB BLD-MCNC: NEGATIVE MG/DL
CLARITY, POC: ABNORMAL
COLOR UR: YELLOW
EXPIRATION DATE: ABNORMAL
GLUCOSE UR STRIP-MCNC: ABNORMAL MG/DL
KETONES UR QL: NEGATIVE
LEUKOCYTE EST, POC: ABNORMAL
Lab: ABNORMAL
NITRITE UR-MCNC: NEGATIVE MG/ML
PH UR: 5.5 [PH] (ref 5–8)
PROT UR STRIP-MCNC: ABNORMAL MG/DL
RBC # UR STRIP: ABNORMAL /UL
SP GR UR: 1.01 (ref 1–1.03)
UROBILINOGEN UR QL: NORMAL

## 2023-10-02 PROCEDURE — 81003 URINALYSIS AUTO W/O SCOPE: CPT | Performed by: UROLOGY

## 2023-10-02 PROCEDURE — 1159F MED LIST DOCD IN RCRD: CPT | Performed by: UROLOGY

## 2023-10-02 PROCEDURE — 1160F RVW MEDS BY RX/DR IN RCRD: CPT | Performed by: UROLOGY

## 2023-10-02 PROCEDURE — 99204 OFFICE O/P NEW MOD 45 MIN: CPT | Performed by: UROLOGY

## 2023-10-02 PROCEDURE — 87086 URINE CULTURE/COLONY COUNT: CPT | Performed by: UROLOGY

## 2023-10-02 RX ORDER — OXYCODONE AND ACETAMINOPHEN 10; 325 MG/1; MG/1
1 TABLET ORAL EVERY 6 HOURS PRN
Qty: 10 TABLET | Refills: 0 | Status: SHIPPED | OUTPATIENT
Start: 2023-10-02 | End: 2023-10-02

## 2023-10-02 RX ORDER — OXYCODONE AND ACETAMINOPHEN 10; 325 MG/1; MG/1
1 TABLET ORAL EVERY 6 HOURS PRN
Qty: 10 TABLET | Refills: 0 | Status: SHIPPED | OUTPATIENT
Start: 2023-10-02

## 2023-10-02 NOTE — H&P (VIEW-ONLY)
Chief Complaint:      Chief Complaint   Patient presents with    Nephrolithiasis     New patient/ Er follow up        HPI:   68 y.o. female furred from the emergency room with a stone she has a large left UPJ stone 7 x 14 mm causing considerable, intermittent colicky flank pain.  Her urinalysis was positive.  She has glucosuria.  She is desirous of intervention.  Keith youssef is being commission in the [employer] in [place] this week so were going to defer the surgery until the 13th we discussed the absolute and relative indicators for intervention.    Past Medical History:     Past Medical History:   Diagnosis Date    Asthma, extrinsic     Rheumatoid arthritis        Current Meds:     Current Outpatient Medications   Medication Sig Dispense Refill    albuterol sulfate  (90 Base) MCG/ACT inhaler Inhale 2 puffs Every 6 (Six) Hours As Needed for Wheezing. 18 g 11    bisoprolol-hydrochlorothiazide (ZIAC) 5-6.25 MG per tablet Take 1 tablet by mouth Daily.  2    cefdinir (OMNICEF) 300 MG capsule Take 1 capsule by mouth 2 (Two) Times a Day for 10 days. 20 capsule 0    hydroxychloroquine (PLAQUENIL) 200 MG tablet Take 1 tablet by mouth 2 (Two) Times a Day.  1    Influenza Vac Subunit Quad (FLUCELVAX) 0.5 ML suspension prefilled syringe injection Flucelvax Quad 0332-9114 (PF) 60 mcg (15 mcg x 4)/0.5 mL IM syringe      methotrexate 2.5 MG tablet Take  by mouth 3 (Three) Doses Each Week. Take Doses 12 (Twelve) Hours Apart.      montelukast (SINGULAIR) 10 MG tablet Take 1 tablet by mouth Every Evening.  5    Multiple Vitamins-Minerals (MULTIVITAMIN WITH MINERALS) tablet tablet Take 1 tablet by mouth Daily.      naproxen (NAPROSYN) 500 MG tablet naproxen 500 mg tablet      ondansetron ODT (ZOFRAN-ODT) 4 MG disintegrating tablet Place 1 tablet on the tongue Every 6 (Six) Hours As Needed for Nausea or Vomiting. 12 tablet 0    ORENCIA 125 MG/ML solution prefilled syringe       oxyCODONE-acetaminophen (PERCOCET) 7.5-325 MG per  tablet Take 1 tablet by mouth Every 6 (Six) Hours As Needed for Moderate Pain. 12 tablet 0    sertraline (ZOLOFT) 100 MG tablet Take 1 tablet by mouth Daily.      tamsulosin (FLOMAX) 0.4 MG capsule 24 hr capsule Take 1 capsule by mouth Daily. 30 capsule 0     No current facility-administered medications for this visit.        Allergies:      Allergies   Allergen Reactions    Benzoyl Peroxide Hives    Humira [Adalimumab] Hives        Past Surgical History:     Past Surgical History:   Procedure Laterality Date    CARPAL TUNNEL RELEASE      GALLBLADDER SURGERY      HIP BIPOLAR REPLACEMENT      HYSTERECTOMY         Social History:     Social History     Socioeconomic History    Marital status:    Tobacco Use    Smoking status: Never    Smokeless tobacco: Never   Substance and Sexual Activity    Alcohol use: No    Drug use: No    Sexual activity: Defer       Family History:     Family History   Problem Relation Age of Onset    Diabetes Mother     Diabetes Father     Heart failure Father     Diabetes Sister     Diabetes Brother        Review of Systems:     Review of Systems   Constitutional: Negative.  Negative for activity change, appetite change, chills, diaphoresis, fatigue and unexpected weight change.   HENT:  Negative for congestion, dental problem, drooling, ear discharge, ear pain, facial swelling, hearing loss, mouth sores, nosebleeds, postnasal drip, rhinorrhea, sinus pressure, sneezing, sore throat, tinnitus, trouble swallowing and voice change.    Eyes: Negative.  Negative for photophobia, pain, discharge, redness, itching and visual disturbance.   Respiratory: Negative.  Negative for apnea, cough, choking, chest tightness, shortness of breath, wheezing and stridor.    Cardiovascular: Negative.  Negative for chest pain, palpitations and leg swelling.   Gastrointestinal: Negative.  Negative for abdominal distention, abdominal pain, anal bleeding, blood in stool, constipation, diarrhea, nausea, rectal  pain and vomiting.   Endocrine: Negative.  Negative for cold intolerance, heat intolerance, polydipsia, polyphagia and polyuria.   Genitourinary:  Positive for flank pain and pelvic pain.   Musculoskeletal: Negative.  Negative for arthralgias, back pain, gait problem, joint swelling, myalgias, neck pain and neck stiffness.   Skin: Negative.  Negative for color change, pallor, rash and wound.   Allergic/Immunologic: Negative.  Negative for environmental allergies, food allergies and immunocompromised state.   Neurological: Negative.  Negative for dizziness, tremors, seizures, syncope, facial asymmetry, speech difficulty, weakness, light-headedness, numbness and headaches.   Hematological: Negative.  Negative for adenopathy. Does not bruise/bleed easily.   Psychiatric/Behavioral:  Negative for agitation, behavioral problems, confusion, decreased concentration, dysphoric mood, hallucinations, self-injury, sleep disturbance and suicidal ideas. The patient is not nervous/anxious and is not hyperactive.    All other systems reviewed and are negative.    Physical Exam:     Physical Exam  Constitutional:       Appearance: She is well-developed.   HENT:      Head: Normocephalic and atraumatic.      Right Ear: External ear normal.      Left Ear: External ear normal.   Eyes:      Conjunctiva/sclera: Conjunctivae normal.      Pupils: Pupils are equal, round, and reactive to light.   Cardiovascular:      Rate and Rhythm: Normal rate and regular rhythm.      Heart sounds: Normal heart sounds.   Pulmonary:      Effort: Pulmonary effort is normal.      Breath sounds: Normal breath sounds.   Abdominal:      General: Bowel sounds are normal. There is no distension.      Palpations: Abdomen is soft. There is no mass.      Tenderness: There is no abdominal tenderness. There is no guarding or rebound.   Genitourinary:     Vagina: No vaginal discharge.   Musculoskeletal:         General: Normal range of motion.   Skin:     General: Skin  is warm and dry.   Neurological:      Mental Status: She is alert.      Deep Tendon Reflexes: Reflexes are normal and symmetric.   Psychiatric:         Behavior: Behavior normal.         Thought Content: Thought content normal.         Judgment: Judgment normal.       I have reviewed the following portions of the patient's history: Allergies, current medications, past family history, past medical history, past social history, past surgical history, problem list, and ROS and confirm it is accurate.    Recent Image (CT and/or KUB):      CT Abdomen and Pelvis: No results found for this or any previous visit.       CT Stone Protocol: Results for orders placed during the hospital encounter of 09/30/23    CT Abdomen Pelvis Stone Protocol    Narrative  PROCEDURE: CT of the abdomen and pelvis performed without IV contrast on  September 30, 2023. The examination was performed with 4 mm axial  imaging and 4 mm sagittal and coronal reconstruction images. Total DLP =  1672. The examination was performed according to as low as reasonably  achievable dose protocol.    HISTORY: Bilateral flank pain.    FINDINGS:  Normal heart size with no pericardial effusion.  Bilateral calcified granulomas.  Fatty infiltration of the liver.  Cholecystectomy clips in the right upper quadrant with mild ectatic  common bile duct.  There is a 7 x 14.3 mm stone at the left UPJ with features of partial  left renal obstruction. The stone is seen best on image 54, series 2 and  there is mild to moderate stranding around the left kidney.  Bilateral nephrolithiasis.  No obstructing stone identified on the right side.  Right and left hip arthroplasty.  Normal appendix.  No bowel or right renal obstruction.  No acute process seen in the bladder.  No abdominal aortic aneurysm or retroperitoneal hemorrhage.  Multilevel degenerative disc disease throughout the lumbar spine with  small posterior bulging discs in the mid and lower lumbar spine levels.  Streak  artifacts from the right and left hip arthroplasty limited  assessment of the lower pelvis.  Prior hysterectomy suggested.    Impression  1.  There is a 7 x 14.3 mm stone at the left UPJ with associated partial  left renal obstruction.  2.  Bilateral nephrolithiasis.  3.  Cholecystectomy.  4.  Fatty infiltration of the liver.  5.  Normal appendix.  6.  Hysterectomy.  7.  Degenerative disc disease in the lumbar spine and lower thoracic  spine.  8.  No free fluid or free air.    This report was finalized on 9/30/2023 2:40 PM by Arnulfo Arellano MD.       KUB: No results found for this or any previous visit.       Labs (past 3 months):      Admission on 09/30/2023, Discharged on 09/30/2023   Component Date Value Ref Range Status    Glucose 09/30/2023 183 (H)  65 - 99 mg/dL Final    BUN 09/30/2023 12  8 - 23 mg/dL Final    Creatinine 09/30/2023 0.81  0.57 - 1.00 mg/dL Final    Sodium 09/30/2023 140  136 - 145 mmol/L Final    Potassium 09/30/2023 4.1  3.5 - 5.2 mmol/L Final    Chloride 09/30/2023 104  98 - 107 mmol/L Final    CO2 09/30/2023 21.6 (L)  22.0 - 29.0 mmol/L Final    Calcium 09/30/2023 9.2  8.6 - 10.5 mg/dL Final    Total Protein 09/30/2023 7.1  6.0 - 8.5 g/dL Final    Albumin 09/30/2023 4.0  3.5 - 5.2 g/dL Final    ALT (SGPT) 09/30/2023 73 (H)  1 - 33 U/L Final    AST (SGOT) 09/30/2023 60 (H)  1 - 32 U/L Final    Alkaline Phosphatase 09/30/2023 54  39 - 117 U/L Final    Total Bilirubin 09/30/2023 0.4  0.0 - 1.2 mg/dL Final    Globulin 09/30/2023 3.1  gm/dL Final    A/G Ratio 09/30/2023 1.3  g/dL Final    BUN/Creatinine Ratio 09/30/2023 14.8  7.0 - 25.0 Final    Anion Gap 09/30/2023 14.4  5.0 - 15.0 mmol/L Final    eGFR 09/30/2023 79.7  >60.0 mL/min/1.73 Final    Color, UA 09/30/2023 Yellow  Yellow, Straw Final    Appearance, UA 09/30/2023 Cloudy (A)  Clear Final    pH, UA 09/30/2023 <=5.0  5.0 - 8.0 Final    Specific Gravity, UA 09/30/2023 1.028  1.005 - 1.030 Final    Glucose, UA 09/30/2023 >=1000 mg/dL (3+)  (A)  Negative Final    Ketones, UA 09/30/2023 Negative  Negative Final    Bilirubin, UA 09/30/2023 Negative  Negative Final    Blood, UA 09/30/2023 Large (3+) (A)  Negative Final    Protein, UA 09/30/2023 100 mg/dL (2+) (A)  Negative Final    Leuk Esterase, UA 09/30/2023 Moderate (2+) (A)  Negative Final    Nitrite, UA 09/30/2023 Positive (A)  Negative Final    Urobilinogen, UA 09/30/2023 0.2 E.U./dL  0.2 - 1.0 E.U./dL Final    WBC 09/30/2023 14.82 (H)  3.40 - 10.80 10*3/mm3 Final    RBC 09/30/2023 4.99  3.77 - 5.28 10*6/mm3 Final    Hemoglobin 09/30/2023 14.2  12.0 - 15.9 g/dL Final    Hematocrit 09/30/2023 44.7  34.0 - 46.6 % Final    MCV 09/30/2023 89.6  79.0 - 97.0 fL Final    MCH 09/30/2023 28.5  26.6 - 33.0 pg Final    MCHC 09/30/2023 31.8  31.5 - 35.7 g/dL Final    RDW 09/30/2023 13.6  12.3 - 15.4 % Final    RDW-SD 09/30/2023 44.6  37.0 - 54.0 fl Final    MPV 09/30/2023 9.4  6.0 - 12.0 fL Final    Platelets 09/30/2023 206  140 - 450 10*3/mm3 Final    Neutrophil % 09/30/2023 85.9 (H)  42.7 - 76.0 % Final    Lymphocyte % 09/30/2023 4.3 (L)  19.6 - 45.3 % Final    Monocyte % 09/30/2023 8.6  5.0 - 12.0 % Final    Eosinophil % 09/30/2023 0.5  0.3 - 6.2 % Final    Basophil % 09/30/2023 0.4  0.0 - 1.5 % Final    Immature Grans % 09/30/2023 0.3  0.0 - 0.5 % Final    Neutrophils, Absolute 09/30/2023 12.72 (H)  1.70 - 7.00 10*3/mm3 Final    Lymphocytes, Absolute 09/30/2023 0.64 (L)  0.70 - 3.10 10*3/mm3 Final    Monocytes, Absolute 09/30/2023 1.27 (H)  0.10 - 0.90 10*3/mm3 Final    Eosinophils, Absolute 09/30/2023 0.08  0.00 - 0.40 10*3/mm3 Final    Basophils, Absolute 09/30/2023 0.06  0.00 - 0.20 10*3/mm3 Final    Immature Grans, Absolute 09/30/2023 0.05  0.00 - 0.05 10*3/mm3 Final    nRBC 09/30/2023 0.0  0.0 - 0.2 /100 WBC Final    Extra Tube 09/30/2023 Hold for add-ons.   Final    Auto resulted.    Extra Tube 09/30/2023 hold for add-on   Final    Auto resulted    Extra Tube 09/30/2023 Hold for add-ons.   Final     Auto resulted.    Extra Tube 09/30/2023 Hold for add-ons.   Final    Auto resulted    RBC, UA 09/30/2023 13-20 (A)  None Seen, 0-2 /HPF Final    WBC, UA 09/30/2023 Too Numerous to Count (A)  None Seen, 0-2 /HPF Final    Bacteria, UA 09/30/2023 2+ (A)  None Seen /HPF Final    Squamous Epithelial Cells, UA 09/30/2023 None Seen  None Seen, 0-2 /HPF Final    Hyaline Casts, UA 09/30/2023 None Seen  None Seen /LPF Final    WBC Clumps, UA 09/30/2023 Small/1+  None Seen /HPF Final    Methodology 09/30/2023 Manual Light Microscopy   Final    Blood Culture 09/30/2023 No growth at 2 days   Preliminary    Blood Culture 09/30/2023 No growth at 2 days   Preliminary    Urine Culture 09/30/2023 >100,000 CFU/mL Gram Negative Bacilli (A)   Preliminary    Lactate 09/30/2023 1.7  0.5 - 2.0 mmol/L Final        Procedure:       Assessment/Plan:   Renal calculus-we discussed the presence of the stone. We discussed the various therapeutic options available including percutaneous nephrostolithotomy, ureteroscopy and extracorporeal shockwave  lithotripsy.  We discussed the risks of lithotripsy including the passage of stones, the development of a large string of stones in the distal ureter known as Steinstrasse.  In the 3% incidence of that we will need to proceed with a ureteroscopy for obstructing fragments.  Extremely rare incidence of renal hematoma and the significance of this.  We discussed percutaneous nephrostolithotomy and its use as well as the risks and benefits such as the need for postoperative hospitalization, and the risk of damage to the kidney and the remote risk of a nephrectomy.  We also discussed the use of ureteroscopy in the upper tracts.  That this is as a decreased success rate to completely remove the stones on the first option and that very likely a stent will be required requiring an additional procedure for removal.  We discussed the absolute relative indicators for intervention including the presence of sepsis  and pain we cannot control ais the primary need for urgent intervention.  We discussed placement of a stent if indicated and the management of the stent as well.  For lithotripsy.  Narcotic pain medication-patient has significant acute pain that I believe would be an indication for the use of narcotic pain medication.  I discussed the significant risks of pain medication and the fact that this will be a short only option and I will give her no more than a three-day supply of pain medication, I will not plan long-term medication, and that this will be sent to a pain clinic if it at all becomes necessary.  We discussed signing a pain medication agreement and the fact that we're going to run a state LAMINE review to be sure the patient is not getting pain medication from elsewhere.  If this is the case, we will not give pain medication as part of the patient's treatment plan of there being prescribed a controlled substance with potential for abuse.  This patient has been well aware of the appropriate dose of such medications including the risks for somnolence, limited ability to drive and/or safety and the significant potential for overdose.  It has been made clear that these medications are for the prescribed patient only without concomitant use of alcohol or other substance unless prescribed by the medical provider.  Has completed prescribing agreement detailing the terms of continue prescribing him a controlled substance including monitoring Lamine reports, the possibility of urine drug screens, and pill counts.  The patient is aware that we review LAMINE reports on a regular basis and scan them into the chart.  History and physical examination exhibited continued safe and appropriate use of controlled substances. We also discussed the fact that the new [place] legislation allows only a three-day prescription for pain medication.  In this situation he will be referred to a chronic pain clinic.    Patient reports  that she is not currently experiencing any symptoms of urinary incontinence.                    This document has been electronically signed by LAN RIOS MD October 2, 2023 14:19 EDT    Dictated Utilizing Dragon Dictation: Part of this note may be an electronic transcription/translation of spoken language to printed text using the Dragon Dictation System.

## 2023-10-02 NOTE — PROGRESS NOTES
Chief Complaint:      Chief Complaint   Patient presents with    Nephrolithiasis     New patient/ Er follow up        HPI:   68 y.o. female furred from the emergency room with a stone she has a large left UPJ stone 7 x 14 mm causing considerable, intermittent colicky flank pain.  Her urinalysis was positive.  She has glucosuria.  She is desirous of intervention.  Keith youssef is being commission in the [employer] in [place] this week so were going to defer the surgery until the 13th we discussed the absolute and relative indicators for intervention.    Past Medical History:     Past Medical History:   Diagnosis Date    Asthma, extrinsic     Rheumatoid arthritis        Current Meds:     Current Outpatient Medications   Medication Sig Dispense Refill    albuterol sulfate  (90 Base) MCG/ACT inhaler Inhale 2 puffs Every 6 (Six) Hours As Needed for Wheezing. 18 g 11    bisoprolol-hydrochlorothiazide (ZIAC) 5-6.25 MG per tablet Take 1 tablet by mouth Daily.  2    cefdinir (OMNICEF) 300 MG capsule Take 1 capsule by mouth 2 (Two) Times a Day for 10 days. 20 capsule 0    hydroxychloroquine (PLAQUENIL) 200 MG tablet Take 1 tablet by mouth 2 (Two) Times a Day.  1    Influenza Vac Subunit Quad (FLUCELVAX) 0.5 ML suspension prefilled syringe injection Flucelvax Quad [id number] (PF) 60 mcg (15 mcg x 4)/0.5 mL IM syringe      methotrexate 2.5 MG tablet Take  by mouth 3 (Three) Doses Each Week. Take Doses 12 (Twelve) Hours Apart.      montelukast (SINGULAIR) 10 MG tablet Take 1 tablet by mouth Every Evening.  5    Multiple Vitamins-Minerals (MULTIVITAMIN WITH MINERALS) tablet tablet Take 1 tablet by mouth Daily.      naproxen (NAPROSYN) 500 MG tablet naproxen 500 mg tablet      ondansetron ODT (ZOFRAN-ODT) 4 MG disintegrating tablet Place 1 tablet on the tongue Every 6 (Six) Hours As Needed for Nausea or Vomiting. 12 tablet 0    ORENCIA 125 MG/ML solution prefilled syringe       oxyCODONE-acetaminophen (PERCOCET) 7.5-325 MG per  tablet Take 1 tablet by mouth Every 6 (Six) Hours As Needed for Moderate Pain. 12 tablet 0    sertraline (ZOLOFT) 100 MG tablet Take 1 tablet by mouth Daily.      tamsulosin (FLOMAX) 0.4 MG capsule 24 hr capsule Take 1 capsule by mouth Daily. 30 capsule 0     No current facility-administered medications for this visit.        Allergies:      Allergies   Allergen Reactions    Benzoyl Peroxide Hives    Humira [Adalimumab] Hives        Past Surgical History:     Past Surgical History:   Procedure Laterality Date    CARPAL TUNNEL RELEASE      GALLBLADDER SURGERY      HIP BIPOLAR REPLACEMENT      HYSTERECTOMY         Social History:     Social History     Socioeconomic History    Marital status:    Tobacco Use    Smoking status: Never    Smokeless tobacco: Never   Substance and Sexual Activity    Alcohol use: No    Drug use: No    Sexual activity: Defer       Family History:     Family History   Problem Relation Age of Onset    Diabetes Mother     Diabetes Father     Heart failure Father     Diabetes Sister     Diabetes Brother        Review of Systems:     Review of Systems   Constitutional: Negative.  Negative for activity change, appetite change, chills, diaphoresis, fatigue and unexpected weight change.   HENT:  Negative for congestion, dental problem, drooling, ear discharge, ear pain, facial swelling, hearing loss, mouth sores, nosebleeds, postnasal drip, rhinorrhea, sinus pressure, sneezing, sore throat, tinnitus, trouble swallowing and voice change.    Eyes: Negative.  Negative for photophobia, pain, discharge, redness, itching and visual disturbance.   Respiratory: Negative.  Negative for apnea, cough, choking, chest tightness, shortness of breath, wheezing and stridor.    Cardiovascular: Negative.  Negative for chest pain, palpitations and leg swelling.   Gastrointestinal: Negative.  Negative for abdominal distention, abdominal pain, anal bleeding, blood in stool, constipation, diarrhea, nausea, rectal  pain and vomiting.   Endocrine: Negative.  Negative for cold intolerance, heat intolerance, polydipsia, polyphagia and polyuria.   Genitourinary:  Positive for flank pain and pelvic pain.   Musculoskeletal: Negative.  Negative for arthralgias, back pain, gait problem, joint swelling, myalgias, neck pain and neck stiffness.   Skin: Negative.  Negative for color change, pallor, rash and wound.   Allergic/Immunologic: Negative.  Negative for environmental allergies, food allergies and immunocompromised state.   Neurological: Negative.  Negative for dizziness, tremors, seizures, syncope, facial asymmetry, speech difficulty, weakness, light-headedness, numbness and headaches.   Hematological: Negative.  Negative for adenopathy. Does not bruise/bleed easily.   Psychiatric/Behavioral:  Negative for agitation, behavioral problems, confusion, decreased concentration, dysphoric mood, hallucinations, self-injury, sleep disturbance and suicidal ideas. The patient is not nervous/anxious and is not hyperactive.    All other systems reviewed and are negative.    Physical Exam:     Physical Exam  Constitutional:       Appearance: She is well-developed.   HENT:      Head: Normocephalic and atraumatic.      Right Ear: External ear normal.      Left Ear: External ear normal.   Eyes:      Conjunctiva/sclera: Conjunctivae normal.      Pupils: Pupils are equal, round, and reactive to light.   Cardiovascular:      Rate and Rhythm: Normal rate and regular rhythm.      Heart sounds: Normal heart sounds.   Pulmonary:      Effort: Pulmonary effort is normal.      Breath sounds: Normal breath sounds.   Abdominal:      General: Bowel sounds are normal. There is no distension.      Palpations: Abdomen is soft. There is no mass.      Tenderness: There is no abdominal tenderness. There is no guarding or rebound.   Genitourinary:     Vagina: No vaginal discharge.   Musculoskeletal:         General: Normal range of motion.   Skin:     General: Skin  is warm and dry.   Neurological:      Mental Status: She is alert.      Deep Tendon Reflexes: Reflexes are normal and symmetric.   Psychiatric:         Behavior: Behavior normal.         Thought Content: Thought content normal.         Judgment: Judgment normal.       I have reviewed the following portions of the patient's history: Allergies, current medications, past family history, past medical history, past social history, past surgical history, problem list, and ROS and confirm it is accurate.    Recent Image (CT and/or KUB):      CT Abdomen and Pelvis: No results found for this or any previous visit.       CT Stone Protocol: Results for orders placed during the hospital encounter of 09/30/23    CT Abdomen Pelvis Stone Protocol    Narrative  PROCEDURE: CT of the abdomen and pelvis performed without IV contrast on  September 30, 2023. The examination was performed with 4 mm axial  imaging and 4 mm sagittal and coronal reconstruction images. Total DLP =  1672. The examination was performed according to as low as reasonably  achievable dose protocol.    HISTORY: Bilateral flank pain.    FINDINGS:  Normal heart size with no pericardial effusion.  Bilateral calcified granulomas.  Fatty infiltration of the liver.  Cholecystectomy clips in the right upper quadrant with mild ectatic  common bile duct.  There is a 7 x 14.3 mm stone at the left UPJ with features of partial  left renal obstruction. The stone is seen best on image 54, series 2 and  there is mild to moderate stranding around the left kidney.  Bilateral nephrolithiasis.  No obstructing stone identified on the right side.  Right and left hip arthroplasty.  Normal appendix.  No bowel or right renal obstruction.  No acute process seen in the bladder.  No abdominal aortic aneurysm or retroperitoneal hemorrhage.  Multilevel degenerative disc disease throughout the lumbar spine with  small posterior bulging discs in the mid and lower lumbar spine levels.  Streak  artifacts from the right and left hip arthroplasty limited  assessment of the lower pelvis.  Prior hysterectomy suggested.    Impression  1.  There is a 7 x 14.3 mm stone at the left UPJ with associated partial  left renal obstruction.  2.  Bilateral nephrolithiasis.  3.  Cholecystectomy.  4.  Fatty infiltration of the liver.  5.  Normal appendix.  6.  Hysterectomy.  7.  Degenerative disc disease in the lumbar spine and lower thoracic  spine.  8.  No free fluid or free air.    This report was finalized on 9/30/2023 2:40 PM by Arnulfo Arellano MD.       KUB: No results found for this or any previous visit.       Labs (past 3 months):      Admission on 09/30/2023, Discharged on 09/30/2023   Component Date Value Ref Range Status    Glucose 09/30/2023 183 (H)  65 - 99 mg/dL Final    BUN 09/30/2023 12  8 - 23 mg/dL Final    Creatinine 09/30/2023 0.81  0.57 - 1.00 mg/dL Final    Sodium 09/30/2023 140  136 - 145 mmol/L Final    Potassium 09/30/2023 4.1  3.5 - 5.2 mmol/L Final    Chloride 09/30/2023 104  98 - 107 mmol/L Final    CO2 09/30/2023 21.6 (L)  22.0 - 29.0 mmol/L Final    Calcium 09/30/2023 9.2  8.6 - 10.5 mg/dL Final    Total Protein 09/30/2023 7.1  6.0 - 8.5 g/dL Final    Albumin 09/30/2023 4.0  3.5 - 5.2 g/dL Final    ALT (SGPT) 09/30/2023 73 (H)  1 - 33 U/L Final    AST (SGOT) 09/30/2023 60 (H)  1 - 32 U/L Final    Alkaline Phosphatase 09/30/2023 54  39 - 117 U/L Final    Total Bilirubin 09/30/2023 0.4  0.0 - 1.2 mg/dL Final    Globulin 09/30/2023 3.1  gm/dL Final    A/G Ratio 09/30/2023 1.3  g/dL Final    BUN/Creatinine Ratio 09/30/2023 14.8  7.0 - 25.0 Final    Anion Gap 09/30/2023 14.4  5.0 - 15.0 mmol/L Final    eGFR 09/30/2023 79.7  >60.0 mL/min/1.73 Final    Color, UA 09/30/2023 Yellow  Yellow, Straw Final    Appearance, UA 09/30/2023 Cloudy (A)  Clear Final    pH, UA 09/30/2023 <=5.0  5.0 - 8.0 Final    Specific Gravity, UA 09/30/2023 1.028  1.005 - 1.030 Final    Glucose, UA 09/30/2023 >=1000 mg/dL (3+)  (A)  Negative Final    Ketones, UA 09/30/2023 Negative  Negative Final    Bilirubin, UA 09/30/2023 Negative  Negative Final    Blood, UA 09/30/2023 Large (3+) (A)  Negative Final    Protein, UA 09/30/2023 100 mg/dL (2+) (A)  Negative Final    Leuk Esterase, UA 09/30/2023 Moderate (2+) (A)  Negative Final    Nitrite, UA 09/30/2023 Positive (A)  Negative Final    Urobilinogen, UA 09/30/2023 0.2 E.U./dL  0.2 - 1.0 E.U./dL Final    WBC 09/30/2023 14.82 (H)  3.40 - 10.80 10*3/mm3 Final    RBC 09/30/2023 4.99  3.77 - 5.28 10*6/mm3 Final    Hemoglobin 09/30/2023 14.2  12.0 - 15.9 g/dL Final    Hematocrit 09/30/2023 44.7  34.0 - 46.6 % Final    MCV 09/30/2023 89.6  79.0 - 97.0 fL Final    MCH 09/30/2023 28.5  26.6 - 33.0 pg Final    MCHC 09/30/2023 31.8  31.5 - 35.7 g/dL Final    RDW 09/30/2023 13.6  12.3 - 15.4 % Final    RDW-SD 09/30/2023 44.6  37.0 - 54.0 fl Final    MPV 09/30/2023 9.4  6.0 - 12.0 fL Final    Platelets 09/30/2023 206  140 - 450 10*3/mm3 Final    Neutrophil % 09/30/2023 85.9 (H)  42.7 - 76.0 % Final    Lymphocyte % 09/30/2023 4.3 (L)  19.6 - 45.3 % Final    Monocyte % 09/30/2023 8.6  5.0 - 12.0 % Final    Eosinophil % 09/30/2023 0.5  0.3 - 6.2 % Final    Basophil % 09/30/2023 0.4  0.0 - 1.5 % Final    Immature Grans % 09/30/2023 0.3  0.0 - 0.5 % Final    Neutrophils, Absolute 09/30/2023 12.72 (H)  1.70 - 7.00 10*3/mm3 Final    Lymphocytes, Absolute 09/30/2023 0.64 (L)  0.70 - 3.10 10*3/mm3 Final    Monocytes, Absolute 09/30/2023 1.27 (H)  0.10 - 0.90 10*3/mm3 Final    Eosinophils, Absolute 09/30/2023 0.08  0.00 - 0.40 10*3/mm3 Final    Basophils, Absolute 09/30/2023 0.06  0.00 - 0.20 10*3/mm3 Final    Immature Grans, Absolute 09/30/2023 0.05  0.00 - 0.05 10*3/mm3 Final    nRBC 09/30/2023 0.0  0.0 - 0.2 /100 WBC Final    Extra Tube 09/30/2023 Hold for add-ons.   Final    Auto resulted.    Extra Tube 09/30/2023 hold for add-on   Final    Auto resulted    Extra Tube 09/30/2023 Hold for add-ons.   Final     Auto resulted.    Extra Tube 09/30/2023 Hold for add-ons.   Final    Auto resulted    RBC, UA 09/30/2023 13-20 (A)  None Seen, 0-2 /HPF Final    WBC, UA 09/30/2023 Too Numerous to Count (A)  None Seen, 0-2 /HPF Final    Bacteria, UA 09/30/2023 2+ (A)  None Seen /HPF Final    Squamous Epithelial Cells, UA 09/30/2023 None Seen  None Seen, 0-2 /HPF Final    Hyaline Casts, UA 09/30/2023 None Seen  None Seen /LPF Final    WBC Clumps, UA 09/30/2023 Small/1+  None Seen /HPF Final    Methodology 09/30/2023 Manual Light Microscopy   Final    Blood Culture 09/30/2023 No growth at 2 days   Preliminary    Blood Culture 09/30/2023 No growth at 2 days   Preliminary    Urine Culture 09/30/2023 >100,000 CFU/mL Gram Negative Bacilli (A)   Preliminary    Lactate 09/30/2023 1.7  0.5 - 2.0 mmol/L Final        Procedure:       Assessment/Plan:   Renal calculus-we discussed the presence of the stone. We discussed the various therapeutic options available including percutaneous nephrostolithotomy, ureteroscopy and extracorporeal shockwave  lithotripsy.  We discussed the risks of lithotripsy including the passage of stones, the development of a large string of stones in the distal ureter known as Steinstrasse.  In the 3% incidence of that we will need to proceed with a ureteroscopy for obstructing fragments.  Extremely rare incidence of renal hematoma and the significance of this.  We discussed percutaneous nephrostolithotomy and its use as well as the risks and benefits such as the need for postoperative hospitalization, and the risk of damage to the kidney and the remote risk of a nephrectomy.  We also discussed the use of ureteroscopy in the upper tracts.  That this is as a decreased success rate to completely remove the stones on the first option and that very likely a stent will be required requiring an additional procedure for removal.  We discussed the absolute relative indicators for intervention including the presence of sepsis  and pain we cannot control ais the primary need for urgent intervention.  We discussed placement of a stent if indicated and the management of the stent as well.  For lithotripsy.  Narcotic pain medication-patient has significant acute pain that I believe would be an indication for the use of narcotic pain medication.  I discussed the significant risks of pain medication and the fact that this will be a short only option and I will give her no more than a three-day supply of pain medication, I will not plan long-term medication, and that this will be sent to a pain clinic if it at all becomes necessary.  We discussed signing a pain medication agreement and the fact that we're going to run a state LAMINE review to be sure the patient is not getting pain medication from elsewhere.  If this is the case, we will not give pain medication as part of the patient's treatment plan of there being prescribed a controlled substance with potential for abuse.  This patient has been well aware of the appropriate dose of such medications including the risks for somnolence, limited ability to drive and/or safety and the significant potential for overdose.  It has been made clear that these medications are for the prescribed patient only without concomitant use of alcohol or other substance unless prescribed by the medical provider.  Has completed prescribing agreement detailing the terms of continue prescribing him a controlled substance including monitoring Lamine reports, the possibility of urine drug screens, and pill counts.  The patient is aware that we review LAMINE reports on a regular basis and scan them into the chart.  History and physical examination exhibited continued safe and appropriate use of controlled substances. We also discussed the fact that the new [place] legislation allows only a three-day prescription for pain medication.  In this situation he will be referred to a chronic pain clinic.    Patient reports  that she is not currently experiencing any symptoms of urinary incontinence.                    This document has been electronically signed by LAN RIOS MD October 2, 2023 14:19 EDT    Dictated Utilizing Dragon Dictation: Part of this note may be an electronic transcription/translation of spoken language to printed text using the Dragon Dictation System.

## 2023-10-03 PROBLEM — N20.0 RENAL CALCULUS: Status: ACTIVE | Noted: 2023-10-03

## 2023-10-03 PROBLEM — N20.0 RENAL CALCULUS, LEFT: Status: ACTIVE | Noted: 2023-10-03

## 2023-10-03 LAB — BACTERIA SPEC AEROBE CULT: ABNORMAL

## 2023-10-04 LAB — BACTERIA SPEC AEROBE CULT: NO GROWTH

## 2023-10-05 DIAGNOSIS — R10.9 FLANK PAIN: Primary | ICD-10-CM

## 2023-10-05 LAB
BACTERIA SPEC AEROBE CULT: NORMAL
BACTERIA SPEC AEROBE CULT: NORMAL

## 2023-10-06 ENCOUNTER — PATIENT ROUNDING (BHMG ONLY) (OUTPATIENT)
Dept: UROLOGY | Facility: CLINIC | Age: 68
End: 2023-10-06
Payer: MEDICARE

## 2023-10-13 ENCOUNTER — HOSPITAL ENCOUNTER (OUTPATIENT)
Facility: HOSPITAL | Age: 68
Discharge: HOME | End: 2023-10-13
Attending: UROLOGY | Admitting: UROLOGY
Payer: MEDICARE

## 2023-10-13 ENCOUNTER — ANESTHESIA EVENT (OUTPATIENT)
Dept: PERIOP | Facility: HOSPITAL | Age: 68
End: 2023-10-13
Payer: MEDICARE

## 2023-10-13 ENCOUNTER — ANESTHESIA (OUTPATIENT)
Dept: PERIOP | Facility: HOSPITAL | Age: 68
End: 2023-10-13
Payer: MEDICARE

## 2023-10-13 VITALS
HEIGHT: 67 IN | TEMPERATURE: 97.9 F | SYSTOLIC BLOOD PRESSURE: 132 MMHG | DIASTOLIC BLOOD PRESSURE: 58 MMHG | WEIGHT: 272 LBS | RESPIRATION RATE: 16 BRPM | HEART RATE: 62 BPM | OXYGEN SATURATION: 95 % | BODY MASS INDEX: 42.69 KG/M2

## 2023-10-13 DIAGNOSIS — N20.0 RENAL CALCULUS: Primary | ICD-10-CM

## 2023-10-13 LAB — GLUCOSE BLDC GLUCOMTR-MCNC: 132 MG/DL (ref 70–130)

## 2023-10-13 PROCEDURE — 50590 FRAGMENTING OF KIDNEY STONE: CPT | Performed by: UROLOGY

## 2023-10-13 PROCEDURE — 25010000002 PROPOFOL 200 MG/20ML EMULSION: Performed by: NURSE ANESTHETIST, CERTIFIED REGISTERED

## 2023-10-13 PROCEDURE — 25010000002 MIDAZOLAM PER 1 MG: Performed by: NURSE ANESTHETIST, CERTIFIED REGISTERED

## 2023-10-13 PROCEDURE — 82948 REAGENT STRIP/BLOOD GLUCOSE: CPT

## 2023-10-13 PROCEDURE — 25010000002 ONDANSETRON PER 1 MG: Performed by: NURSE ANESTHETIST, CERTIFIED REGISTERED

## 2023-10-13 PROCEDURE — 25010000002 GENTAMICIN PER 80 MG: Performed by: UROLOGY

## 2023-10-13 PROCEDURE — 25010000002 FENTANYL CITRATE (PF) 50 MCG/ML SOLUTION: Performed by: NURSE ANESTHETIST, CERTIFIED REGISTERED

## 2023-10-13 PROCEDURE — 25810000003 LACTATED RINGERS PER 1000 ML: Performed by: ANESTHESIOLOGY

## 2023-10-13 RX ORDER — SODIUM CHLORIDE 9 MG/ML
40 INJECTION, SOLUTION INTRAVENOUS AS NEEDED
Status: DISCONTINUED | OUTPATIENT
Start: 2023-10-13 | End: 2023-10-13 | Stop reason: HOSPADM

## 2023-10-13 RX ORDER — FENTANYL CITRATE 50 UG/ML
50 INJECTION, SOLUTION INTRAMUSCULAR; INTRAVENOUS
Status: DISCONTINUED | OUTPATIENT
Start: 2023-10-13 | End: 2023-10-13 | Stop reason: HOSPADM

## 2023-10-13 RX ORDER — FAMOTIDINE 10 MG/ML
INJECTION, SOLUTION INTRAVENOUS AS NEEDED
Status: DISCONTINUED | OUTPATIENT
Start: 2023-10-13 | End: 2023-10-13 | Stop reason: SURG

## 2023-10-13 RX ORDER — MIDAZOLAM HYDROCHLORIDE 1 MG/ML
0.5 INJECTION INTRAMUSCULAR; INTRAVENOUS
Status: DISCONTINUED | OUTPATIENT
Start: 2023-10-13 | End: 2023-10-13 | Stop reason: HOSPADM

## 2023-10-13 RX ORDER — ONDANSETRON 2 MG/ML
INJECTION INTRAMUSCULAR; INTRAVENOUS AS NEEDED
Status: DISCONTINUED | OUTPATIENT
Start: 2023-10-13 | End: 2023-10-13 | Stop reason: SURG

## 2023-10-13 RX ORDER — SODIUM CHLORIDE 0.9 % (FLUSH) 0.9 %
10 SYRINGE (ML) INJECTION AS NEEDED
Status: DISCONTINUED | OUTPATIENT
Start: 2023-10-13 | End: 2023-10-13 | Stop reason: HOSPADM

## 2023-10-13 RX ORDER — MIDAZOLAM HYDROCHLORIDE 1 MG/ML
INJECTION INTRAMUSCULAR; INTRAVENOUS AS NEEDED
Status: DISCONTINUED | OUTPATIENT
Start: 2023-10-13 | End: 2023-10-13 | Stop reason: SURG

## 2023-10-13 RX ORDER — SODIUM CHLORIDE, SODIUM LACTATE, POTASSIUM CHLORIDE, CALCIUM CHLORIDE 600; 310; 30; 20 MG/100ML; MG/100ML; MG/100ML; MG/100ML
125 INJECTION, SOLUTION INTRAVENOUS ONCE
Status: COMPLETED | OUTPATIENT
Start: 2023-10-13 | End: 2023-10-13

## 2023-10-13 RX ORDER — IPRATROPIUM BROMIDE AND ALBUTEROL SULFATE 2.5; .5 MG/3ML; MG/3ML
3 SOLUTION RESPIRATORY (INHALATION) ONCE AS NEEDED
Status: DISCONTINUED | OUTPATIENT
Start: 2023-10-13 | End: 2023-10-13 | Stop reason: HOSPADM

## 2023-10-13 RX ORDER — GENTAMICIN SULFATE 80 MG/100ML
80 INJECTION, SOLUTION INTRAVENOUS ONCE
Status: COMPLETED | OUTPATIENT
Start: 2023-10-13 | End: 2023-10-13

## 2023-10-13 RX ORDER — FENTANYL CITRATE 50 UG/ML
INJECTION, SOLUTION INTRAMUSCULAR; INTRAVENOUS AS NEEDED
Status: DISCONTINUED | OUTPATIENT
Start: 2023-10-13 | End: 2023-10-13 | Stop reason: SURG

## 2023-10-13 RX ORDER — MEPERIDINE HYDROCHLORIDE 25 MG/ML
12.5 INJECTION INTRAMUSCULAR; INTRAVENOUS; SUBCUTANEOUS
Status: DISCONTINUED | OUTPATIENT
Start: 2023-10-13 | End: 2023-10-13 | Stop reason: HOSPADM

## 2023-10-13 RX ORDER — SODIUM CHLORIDE, SODIUM LACTATE, POTASSIUM CHLORIDE, CALCIUM CHLORIDE 600; 310; 30; 20 MG/100ML; MG/100ML; MG/100ML; MG/100ML
100 INJECTION, SOLUTION INTRAVENOUS ONCE AS NEEDED
Status: DISCONTINUED | OUTPATIENT
Start: 2023-10-13 | End: 2023-10-13 | Stop reason: HOSPADM

## 2023-10-13 RX ORDER — ONDANSETRON 2 MG/ML
4 INJECTION INTRAMUSCULAR; INTRAVENOUS AS NEEDED
Status: DISCONTINUED | OUTPATIENT
Start: 2023-10-13 | End: 2023-10-13 | Stop reason: HOSPADM

## 2023-10-13 RX ORDER — PROPOFOL 10 MG/ML
INJECTION, EMULSION INTRAVENOUS AS NEEDED
Status: DISCONTINUED | OUTPATIENT
Start: 2023-10-13 | End: 2023-10-13 | Stop reason: SURG

## 2023-10-13 RX ORDER — OXYCODONE AND ACETAMINOPHEN 10; 325 MG/1; MG/1
1 TABLET ORAL EVERY 6 HOURS PRN
Qty: 12 TABLET | Refills: 0 | Status: SHIPPED | OUTPATIENT
Start: 2023-10-13

## 2023-10-13 RX ORDER — OXYCODONE HYDROCHLORIDE AND ACETAMINOPHEN 5; 325 MG/1; MG/1
1 TABLET ORAL ONCE AS NEEDED
Status: COMPLETED | OUTPATIENT
Start: 2023-10-13 | End: 2023-10-13

## 2023-10-13 RX ORDER — SCOLOPAMINE TRANSDERMAL SYSTEM 1 MG/1
1 PATCH, EXTENDED RELEASE TRANSDERMAL ONCE
Status: DISCONTINUED | OUTPATIENT
Start: 2023-10-13 | End: 2023-10-13 | Stop reason: HOSPADM

## 2023-10-13 RX ORDER — LIDOCAINE HYDROCHLORIDE 20 MG/ML
INJECTION, SOLUTION EPIDURAL; INFILTRATION; INTRACAUDAL; PERINEURAL AS NEEDED
Status: DISCONTINUED | OUTPATIENT
Start: 2023-10-13 | End: 2023-10-13 | Stop reason: SURG

## 2023-10-13 RX ORDER — SODIUM CHLORIDE 0.9 % (FLUSH) 0.9 %
10 SYRINGE (ML) INJECTION EVERY 12 HOURS SCHEDULED
Status: DISCONTINUED | OUTPATIENT
Start: 2023-10-13 | End: 2023-10-13 | Stop reason: HOSPADM

## 2023-10-13 RX ADMIN — SODIUM CHLORIDE, POTASSIUM CHLORIDE, SODIUM LACTATE AND CALCIUM CHLORIDE: 600; 310; 30; 20 INJECTION, SOLUTION INTRAVENOUS at 09:07

## 2023-10-13 RX ADMIN — FENTANYL CITRATE 50 MCG: 50 INJECTION INTRAMUSCULAR; INTRAVENOUS at 09:12

## 2023-10-13 RX ADMIN — MIDAZOLAM HYDROCHLORIDE 2 MG: 1 INJECTION, SOLUTION INTRAMUSCULAR; INTRAVENOUS at 09:07

## 2023-10-13 RX ADMIN — FENTANYL CITRATE 50 MCG: 50 INJECTION INTRAMUSCULAR; INTRAVENOUS at 09:29

## 2023-10-13 RX ADMIN — PROPOFOL 150 MG: 10 INJECTION, EMULSION INTRAVENOUS at 09:12

## 2023-10-13 RX ADMIN — FAMOTIDINE 20 MG: 10 INJECTION, SOLUTION INTRAVENOUS at 09:07

## 2023-10-13 RX ADMIN — LIDOCAINE HYDROCHLORIDE 60 MG: 20 INJECTION, SOLUTION EPIDURAL; INFILTRATION; INTRACAUDAL; PERINEURAL at 09:12

## 2023-10-13 RX ADMIN — OXYCODONE HYDROCHLORIDE AND ACETAMINOPHEN 1 TABLET: 5; 325 TABLET ORAL at 10:03

## 2023-10-13 RX ADMIN — ONDANSETRON 4 MG: 2 INJECTION INTRAMUSCULAR; INTRAVENOUS at 09:16

## 2023-10-13 RX ADMIN — GENTAMICIN SULFATE 80 MG: 80 INJECTION, SOLUTION INTRAVENOUS at 09:16

## 2023-10-13 NOTE — ANESTHESIA PROCEDURE NOTES
Airway  Urgency: elective    Date/Time: 10/13/2023 9:12 AM    General Information and Staff    Patient location during procedure: OR    Indications and Patient Condition    Preoxygenated: yes  Mask difficulty assessment: 0 - not attempted    Final Airway Details  Final airway type: supraglottic airway      Successful airway: LMA  Size 4     Number of attempts at approach: 1  Assessment: lips, teeth, and gum same as pre-op

## 2023-10-13 NOTE — ANESTHESIA POSTPROCEDURE EVALUATION
Patient: Amaya Brown    Procedure Summary       Date: 10/13/23 Room / Location: [facility] OR 09 /  [facility] OR    Anesthesia Start: 0907 Anesthesia Stop: 0946    Procedure: EXTRACORPOREAL SHOCKWAVE LITHOTRIPSY (Left) Diagnosis:       Renal calculus, left      (Renal calculus, left [N20.0])    Surgeons: Prudencio Helton MD Provider: Temo Marrero MD    Anesthesia Type: general ASA Status: 3            Anesthesia Type: general    Vitals  Vitals Value Taken Time   /63 10/13/23 1003   Temp 97.9 øF (36.6 øC) 10/13/23 0947   Pulse 68 10/13/23 1008   Resp 16 10/13/23 1002   SpO2 96 % 10/13/23 1008   Vitals shown include unfiled device data.        Post Anesthesia Care and Evaluation    Patient location during evaluation: PACU  Patient participation: complete - patient participated  Level of consciousness: awake  Pain score: 0  Pain management: adequate    Airway patency: patent  Anesthetic complications: No anesthetic complications  PONV Status: none  Cardiovascular status: hemodynamically stable  Respiratory status: nasal cannula  Hydration status: acceptable

## 2023-10-13 NOTE — ANESTHESIA PREPROCEDURE EVALUATION
Anesthesia Evaluation     Patient summary reviewed and Nursing notes reviewed   history of anesthetic complications:   NPO Solid Status: > 8 hours  NPO Liquid Status: > 8 hours           Airway   Mallampati: II  TM distance: >3 FB  Neck ROM: full  Dental    (+) poor dentition        Pulmonary     breath sounds clear to auscultation  (+) asthma,sleep apnea  Cardiovascular   Exercise tolerance: good (4-7 METS)    Patient on routine beta blocker and Beta blocker given within 24 hours of surgery  Rhythm: regular  Rate: normal    (+) hypertension      Neuro/Psych  (+) psychiatric history  GI/Hepatic/Renal/Endo    (+) obesity, morbid obesity, renal disease stones, diabetes mellitus    Musculoskeletal     Abdominal   (+) obese    Abdomen: soft.   Substance History - negative use     OB/GYN negative ob/gyn ROS         Other   arthritis,                   Anesthesia Plan    ASA 3     general     intravenous induction     Anesthetic plan, risks, benefits, and alternatives have been provided, discussed and informed consent has been obtained with: patient.  Pre-procedure education provided  Use of blood products discussed with  Consented to blood products.    Plan discussed with CRNA.    CODE STATUS:

## 2023-10-13 NOTE — OP NOTE
EXTRACORPOREAL SHOCKWAVE LITHOTRIPSY  Procedure Note    Amaya Brown  10/13/2023    Pre-op Diagnosis:   Renal calculus, left [N20.0]    Post-op Diagnosis:     Post-Op Diagnosis Codes:     * Renal calculus, left [N20.0]    Procedure/CPTr Codes:    68-year-old white female with a painful renal calculus for lithotripsy.  ESWL-the patient is a candidate for extracorporeal shockwave lithotripsy.  We discussed the type of stone and the complications associated with the procedure including, but not limited to, pain in the flank, hematoma, spontaneous renal hemorrhage, inadequate fragmentation of stones, the need for passage of the stones, the need for concomitant additional procedures in the range of 24%, the risk of a distal fragment in the range of 3% requiring ureteroscopic removal, and the fact that sometimes a stent is indicated based on the size and the density of the stone as determined on the CAT scan.  Additionally, we discussed percutaneous nephrostolithotomy.  Including the mini PERC.  With its attendant risks of anesthesia bleeding infection and the fact that is a invasive procedure with the remote possibility of a nephrectomy.  We also discussed the use of ureteroscopy which is a rigid or flexible instrument placed up into the kidney to break up stones with the laser beam and very likely a postop stent and a high likelihood of additional concomitant procedures.  Following an informed consent, he was brought to the operative suite and underwent induction of general endotracheal anesthetic.  The stone was localized at F2 and a total of 3000 shockwaves was administered without complication.  There was excellent fragmentation  He was awake and alert and returned to recovery room.       Procedure(s):  EXTRACORPOREAL SHOCKWAVE LITHOTRIPSY    Surgeon(s):  Prudencio Helton MD    Anesthesia: see anesthesia record    Staff:   Circulator: Gertrudis Paige RN  Scrub Person: Katty Campbell LPN; Giovanna Navarrete  Selena Figueroa    Estimated Blood Loss: none  Urine Voided: * No values recorded between 10/13/2023  9:07 AM and 10/13/2023  9:46 AM *    Specimens:                None      Drains: None    Findings: Moderate fragmentation    Blood: N/A    Complications: None    Grafts and Implants: None    Prudencio Helton MD     Date: 10/13/2023  Time: 09:50 EDT

## 2023-10-17 ENCOUNTER — HOSPITAL ENCOUNTER (OUTPATIENT)
Dept: GENERAL RADIOLOGY | Facility: HOSPITAL | Age: 68
Discharge: HOME | End: 2023-10-17
Admitting: UROLOGY
Payer: MEDICARE

## 2023-10-17 ENCOUNTER — OFFICE VISIT (OUTPATIENT)
Dept: UROLOGY | Facility: CLINIC | Age: 68
End: 2023-10-17
Payer: MEDICARE

## 2023-10-17 VITALS
BODY MASS INDEX: 42.69 KG/M2 | HEART RATE: 71 BPM | WEIGHT: 272 LBS | HEIGHT: 67 IN | DIASTOLIC BLOOD PRESSURE: 85 MMHG | SYSTOLIC BLOOD PRESSURE: 158 MMHG

## 2023-10-17 DIAGNOSIS — N20.0 RENAL CALCULUS, LEFT: Primary | ICD-10-CM

## 2023-10-17 DIAGNOSIS — R10.9 FLANK PAIN: ICD-10-CM

## 2023-10-17 PROCEDURE — 74018 RADEX ABDOMEN 1 VIEW: CPT

## 2023-10-17 NOTE — PROGRESS NOTES
Chief Complaint:      Chief Complaint   Patient presents with    Flank Pain       HPI:   68 y.o. female.  Status post a successful lithotripsy has a 4 mm lower third stone that is asymptomatic I will see her back in 3 weeks with KUB    Past Medical History:     Past Medical History:   Diagnosis Date    Anxiety and depression     Asthma, extrinsic     Diabetes mellitus     Hypertension     PONV (postoperative nausea and vomiting)     Rheumatoid arthritis     Sleep apnea     uses c-pap       Current Meds:     Current Outpatient Medications   Medication Sig Dispense Refill    albuterol sulfate  (90 Base) MCG/ACT inhaler Inhale 2 puffs Every 6 (Six) Hours As Needed for Wheezing. 18 g 11    bisoprolol-hydrochlorothiazide (ZIAC) 5-6.25 MG per tablet Take 1 tablet by mouth Daily.  2    cetirizine (zyrTEC) 10 MG tablet Take 1 tablet by mouth Daily.      dapagliflozin Propanediol (Farxiga) 10 MG tablet Take 10 mg by mouth Daily.      glimepiride (AMARYL) 2 MG tablet Take 1 tablet by mouth Every Morning Before Breakfast.      hydroxychloroquine (PLAQUENIL) 200 MG tablet Take 1 tablet by mouth 2 (Two) Times a Day.  1    Influenza Vac Subunit Quad (FLUCELVAX) 0.5 ML suspension prefilled syringe injection Flucelvax Quad [id number] (PF) 60 mcg (15 mcg x 4)/0.5 mL IM syringe      montelukast (SINGULAIR) 10 MG tablet Take 1 tablet by mouth Every Evening.  5    Multiple Vitamins-Minerals (MULTIVITAMIN WITH MINERALS) tablet tablet Take 1 tablet by mouth Daily.      naproxen (NAPROSYN) 500 MG tablet naproxen 500 mg tablet      ondansetron ODT (ZOFRAN-ODT) 4 MG disintegrating tablet Place 1 tablet on the tongue Every 6 (Six) Hours As Needed for Nausea or Vomiting. 12 tablet 0    oxyCODONE-acetaminophen (Percocet)  MG per tablet Take 1 tablet by mouth Every 6 (Six) Hours As Needed for Moderate Pain. 10 tablet 0    oxyCODONE-acetaminophen (Percocet)  MG per tablet Take 1 tablet by mouth Every 6 (Six) Hours As Needed for  Moderate Pain (Pain). 12 tablet 0    oxyCODONE-acetaminophen (PERCOCET) 7.5-325 MG per tablet Take 1 tablet by mouth Every 6 (Six) Hours As Needed for Moderate Pain. 12 tablet 0    sertraline (ZOLOFT) 100 MG tablet Take 1 tablet by mouth Daily.      tamsulosin (FLOMAX) 0.4 MG capsule 24 hr capsule Take 1 capsule by mouth Daily. 30 capsule 0     No current facility-administered medications for this visit.        Allergies:      Allergies   Allergen Reactions    Benzoyl Peroxide Hives    Humira [Adalimumab] Hives        Past Surgical History:     Past Surgical History:   Procedure Laterality Date    ABDOMINAL SURGERY      CARDIAC CATHETERIZATION      CARPAL TUNNEL RELEASE      COLONOSCOPY      ENDOSCOPY      GALLBLADDER SURGERY      HIP BIPOLAR REPLACEMENT Bilateral     HYSTERECTOMY      JOINT REPLACEMENT      SKIN BIOPSY      TONSILLECTOMY         Social History:     Social History     Socioeconomic History    Marital status:    Tobacco Use    Smoking status: Never    Smokeless tobacco: Never   Vaping Use    Vaping Use: Never used   Substance and Sexual Activity    Alcohol use: No    Drug use: No    Sexual activity: Defer       Family History:     Family History   Problem Relation Age of Onset    Diabetes Mother     Diabetes Father     Heart failure Father     Diabetes Sister     Diabetes Brother        Review of Systems:     Review of Systems   Constitutional: Negative.  Negative for activity change, appetite change, chills, diaphoresis, fatigue, fever and unexpected weight change.   HENT:  Negative for congestion, dental problem, drooling, ear discharge, ear pain, facial swelling, hearing loss, mouth sores, nosebleeds, postnasal drip, rhinorrhea, sinus pressure, sneezing, sore throat, tinnitus, trouble swallowing and voice change.    Eyes: Negative.  Negative for photophobia, pain, discharge, redness, itching and visual disturbance.   Respiratory: Negative.  Negative for apnea, cough, choking, chest  tightness, shortness of breath, wheezing and stridor.    Cardiovascular: Negative.  Negative for chest pain, palpitations and leg swelling.   Gastrointestinal: Negative.  Negative for abdominal distention, abdominal pain, anal bleeding, blood in stool, constipation, diarrhea, nausea, rectal pain and vomiting.   Endocrine: Negative.  Negative for cold intolerance, heat intolerance, polydipsia, polyphagia and polyuria.   Genitourinary:  Negative for dysuria and pelvic pain.   Musculoskeletal:  Positive for back pain. Negative for arthralgias, gait problem, joint swelling, myalgias, neck pain and neck stiffness.   Skin: Negative.  Negative for color change, pallor, rash and wound.   Allergic/Immunologic: Negative.  Negative for environmental allergies, food allergies and immunocompromised state.   Neurological: Negative.  Negative for dizziness, tremors, seizures, syncope, facial asymmetry, speech difficulty, weakness, light-headedness, numbness and headaches.   Hematological: Negative.  Negative for adenopathy. Does not bruise/bleed easily.   Psychiatric/Behavioral:  Negative for agitation, behavioral problems, confusion, decreased concentration, dysphoric mood, hallucinations, self-injury, sleep disturbance and suicidal ideas. The patient is not nervous/anxious and is not hyperactive.    All other systems reviewed and are negative.      Physical Exam:     Physical Exam  Constitutional:       Appearance: She is well-developed.   HENT:      Head: Normocephalic and atraumatic.      Right Ear: External ear normal.      Left Ear: External ear normal.   Eyes:      Conjunctiva/sclera: Conjunctivae normal.      Pupils: Pupils are equal, round, and reactive to light.   Cardiovascular:      Rate and Rhythm: Normal rate and regular rhythm.      Heart sounds: Normal heart sounds.   Pulmonary:      Effort: Pulmonary effort is normal.      Breath sounds: Normal breath sounds.   Abdominal:      General: Bowel sounds are normal.  There is no distension.      Palpations: Abdomen is soft. There is no mass.      Tenderness: There is no abdominal tenderness. There is no guarding or rebound.   Genitourinary:     Vagina: No vaginal discharge.   Musculoskeletal:         General: Normal range of motion.   Skin:     General: Skin is warm and dry.   Neurological:      Mental Status: She is alert.      Deep Tendon Reflexes: Reflexes are normal and symmetric.   Psychiatric:         Behavior: Behavior normal.         Thought Content: Thought content normal.         Judgment: Judgment normal.         I have reviewed the following portions of the patient's history: Allergies, current medications, past family history, past medical history, past social history, past surgical history, problem list, and ROS and confirm it is accurate.    Recent Image (CT and/or KUB):      CT Abdomen and Pelvis: No results found for this or any previous visit.       CT Stone Protocol: Results for orders placed during the hospital encounter of 09/30/23    CT Abdomen Pelvis Stone Protocol    Narrative  PROCEDURE: CT of the abdomen and pelvis performed without IV contrast on  September 30, 2023. The examination was performed with 4 mm axial  imaging and 4 mm sagittal and coronal reconstruction images. Total DLP =  1672. The examination was performed according to as low as reasonably  achievable dose protocol.    HISTORY: Bilateral flank pain.    FINDINGS:  Normal heart size with no pericardial effusion.  Bilateral calcified granulomas.  Fatty infiltration of the liver.  Cholecystectomy clips in the right upper quadrant with mild ectatic  common bile duct.  There is a 7 x 14.3 mm stone at the left UPJ with features of partial  left renal obstruction. The stone is seen best on image 54, series 2 and  there is mild to moderate stranding around the left kidney.  Bilateral nephrolithiasis.  No obstructing stone identified on the right side.  Right and left hip arthroplasty.  Normal  appendix.  No bowel or right renal obstruction.  No acute process seen in the bladder.  No abdominal aortic aneurysm or retroperitoneal hemorrhage.  Multilevel degenerative disc disease throughout the lumbar spine with  small posterior bulging discs in the mid and lower lumbar spine levels.  Streak artifacts from the right and left hip arthroplasty limited  assessment of the lower pelvis.  Prior hysterectomy suggested.    Impression  1.  There is a 7 x 14.3 mm stone at the left UPJ with associated partial  left renal obstruction.  2.  Bilateral nephrolithiasis.  3.  Cholecystectomy.  4.  Fatty infiltration of the liver.  5.  Normal appendix.  6.  Hysterectomy.  7.  Degenerative disc disease in the lumbar spine and lower thoracic  spine.  8.  No free fluid or free air.    This report was finalized on 9/30/2023 2:40 PM by Arnulfo Arellano MD.       KUB: No results found for this or any previous visit.       Labs (past 3 months):      Admission on 10/13/2023, Discharged on 10/13/2023   Component Date Value Ref Range Status    Glucose 10/13/2023 132 (H)  70 - 130 mg/dL Final   Office Visit on 10/02/2023   Component Date Value Ref Range Status    Color 10/02/2023 Yellow  Yellow, Straw, Dark Yellow, Kalli Final    Clarity, UA 10/02/2023 Cloudy (A)  Clear Final    Specific Gravity  10/02/2023 1.010  1.005 - 1.030 Final    pH, Urine 10/02/2023 5.5  5.0 - 8.0 Final    Leukocytes 10/02/2023 Moderate (2+) (A)  Negative Final    Nitrite, UA 10/02/2023 Negative  Negative Final    Protein, POC 10/02/2023 1+ (A)  Negative mg/dL Final    Glucose, UA 10/02/2023 3+ (A)  Negative mg/dL Final    Ketones, UA 10/02/2023 Negative  Negative Final    Urobilinogen, UA 10/02/2023 Normal  Normal, 0.2 E.U./dL Final    Bilirubin 10/02/2023 Negative  Negative Final    Blood, UA 10/02/2023 3+ (A)  Negative Final    Lot Number 10/02/2023 n   Final    Expiration Date 10/02/2023 n   Final    Urine Culture 10/02/2023 No growth   Final   Admission on  09/30/2023, Discharged on 09/30/2023   Component Date Value Ref Range Status    Glucose 09/30/2023 183 (H)  65 - 99 mg/dL Final    BUN 09/30/2023 12  8 - 23 mg/dL Final    Creatinine 09/30/2023 0.81  0.57 - 1.00 mg/dL Final    Sodium 09/30/2023 140  136 - 145 mmol/L Final    Potassium 09/30/2023 4.1  3.5 - 5.2 mmol/L Final    Chloride 09/30/2023 104  98 - 107 mmol/L Final    CO2 09/30/2023 21.6 (L)  22.0 - 29.0 mmol/L Final    Calcium 09/30/2023 9.2  8.6 - 10.5 mg/dL Final    Total Protein 09/30/2023 7.1  6.0 - 8.5 g/dL Final    Albumin 09/30/2023 4.0  3.5 - 5.2 g/dL Final    ALT (SGPT) 09/30/2023 73 (H)  1 - 33 U/L Final    AST (SGOT) 09/30/2023 60 (H)  1 - 32 U/L Final    Alkaline Phosphatase 09/30/2023 54  39 - 117 U/L Final    Total Bilirubin 09/30/2023 0.4  0.0 - 1.2 mg/dL Final    Globulin 09/30/2023 3.1  gm/dL Final    A/G Ratio 09/30/2023 1.3  g/dL Final    BUN/Creatinine Ratio 09/30/2023 14.8  7.0 - 25.0 Final    Anion Gap 09/30/2023 14.4  5.0 - 15.0 mmol/L Final    eGFR 09/30/2023 79.7  >60.0 mL/min/1.73 Final    Color, UA 09/30/2023 Yellow  Yellow, Straw Final    Appearance, UA 09/30/2023 Cloudy (A)  Clear Final    pH, UA 09/30/2023 <=5.0  5.0 - 8.0 Final    Specific Gravity, UA 09/30/2023 1.028  1.005 - 1.030 Final    Glucose, UA 09/30/2023 >=1000 mg/dL (3+) (A)  Negative Final    Ketones, UA 09/30/2023 Negative  Negative Final    Bilirubin, UA 09/30/2023 Negative  Negative Final    Blood, UA 09/30/2023 Large (3+) (A)  Negative Final    Protein, UA 09/30/2023 100 mg/dL (2+) (A)  Negative Final    Leuk Esterase, UA 09/30/2023 Moderate (2+) (A)  Negative Final    Nitrite, UA 09/30/2023 Positive (A)  Negative Final    Urobilinogen, UA 09/30/2023 0.2 E.U./dL  0.2 - 1.0 E.U./dL Final    WBC 09/30/2023 14.82 (H)  3.40 - 10.80 10*3/mm3 Final    RBC 09/30/2023 4.99  3.77 - 5.28 10*6/mm3 Final    Hemoglobin 09/30/2023 14.2  12.0 - 15.9 g/dL Final    Hematocrit 09/30/2023 44.7  34.0 - 46.6 % Final    MCV  09/30/2023 89.6  79.0 - 97.0 fL Final    MCH 09/30/2023 28.5  26.6 - 33.0 pg Final    MCHC 09/30/2023 31.8  31.5 - 35.7 g/dL Final    RDW 09/30/2023 13.6  12.3 - 15.4 % Final    RDW-SD 09/30/2023 44.6  37.0 - 54.0 fl Final    MPV 09/30/2023 9.4  6.0 - 12.0 fL Final    Platelets 09/30/2023 206  140 - 450 10*3/mm3 Final    Neutrophil % 09/30/2023 85.9 (H)  42.7 - 76.0 % Final    Lymphocyte % 09/30/2023 4.3 (L)  19.6 - 45.3 % Final    Monocyte % 09/30/2023 8.6  5.0 - 12.0 % Final    Eosinophil % 09/30/2023 0.5  0.3 - 6.2 % Final    Basophil % 09/30/2023 0.4  0.0 - 1.5 % Final    Immature Grans % 09/30/2023 0.3  0.0 - 0.5 % Final    Neutrophils, Absolute 09/30/2023 12.72 (H)  1.70 - 7.00 10*3/mm3 Final    Lymphocytes, Absolute 09/30/2023 0.64 (L)  0.70 - 3.10 10*3/mm3 Final    Monocytes, Absolute 09/30/2023 1.27 (H)  0.10 - 0.90 10*3/mm3 Final    Eosinophils, Absolute 09/30/2023 0.08  0.00 - 0.40 10*3/mm3 Final    Basophils, Absolute 09/30/2023 0.06  0.00 - 0.20 10*3/mm3 Final    Immature Grans, Absolute 09/30/2023 0.05  0.00 - 0.05 10*3/mm3 Final    nRBC 09/30/2023 0.0  0.0 - 0.2 /100 WBC Final    Extra Tube 09/30/2023 Hold for add-ons.   Final    Auto resulted.    Extra Tube 09/30/2023 hold for add-on   Final    Auto resulted    Extra Tube 09/30/2023 Hold for add-ons.   Final    Auto resulted.    Extra Tube 09/30/2023 Hold for add-ons.   Final    Auto resulted    RBC, UA 09/30/2023 13-20 (A)  None Seen, 0-2 /HPF Final    WBC, UA 09/30/2023 Too Numerous to Count (A)  None Seen, 0-2 /HPF Final    Bacteria, UA 09/30/2023 2+ (A)  None Seen /HPF Final    Squamous Epithelial Cells, UA 09/30/2023 None Seen  None Seen, 0-2 /HPF Final    Hyaline Casts, UA 09/30/2023 None Seen  None Seen /LPF Final    WBC Clumps, UA 09/30/2023 Small/1+  None Seen /HPF Final    Methodology 09/30/2023 Manual Light Microscopy   Final    Blood Culture 09/30/2023 No growth at 5 days   Final    Blood Culture 09/30/2023 No growth at 5 days   Final     Urine Culture 09/30/2023 >100,000 CFU/mL Escherichia coli (A)   Final    Lactate 09/30/2023 1.7  0.5 - 2.0 mmol/L Final        Procedure:       Assessment/Plan:   Renal calculus-we discussed the presence of the stone. We discussed the various therapeutic options available including percutaneous nephrostolithotomy, ureteroscopy and extracorporeal shockwave  lithotripsy.  We discussed the risks of lithotripsy including the passage of stones, the development of a large string of stones in the distal ureter known as Steinstrasse.  In the 3% incidence of that we will need to proceed with a ureteroscopy for obstructing fragments.  Extremely rare incidence of renal hematoma and the significance of this.  We discussed percutaneous nephrostolithotomy and its use as well as the risks and benefits such as the need for postoperative hospitalization, and the risk of damage to the kidney and the remote risk of a nephrectomy.  We also discussed the use of ureteroscopy in the upper tracts.  That this is as a decreased success rate to completely remove the stones on the first option and that very likely a stent will be required requiring an additional procedure for removal.  We discussed the absolute relative indicators for intervention including the presence of sepsis and pain we cannot control ais the primary need for urgent intervention.  We discussed placement of a stent if indicated and the management of the stent as well.  Status post a very successful lithotripsy with a 4 mm distal fragment is currently asymptomatic I will see her back in 3 weeks with KUB    She is currently not experiencing urinary incontinence  This document has been electronically signed by LAN RIOS MD October 17, 2023 13:10 EDT    Dictated Utilizing Dragon Dictation: Part of this note may be an electronic transcription/translation of spoken language to printed text using the Dragon Dictation System.  Answers submitted by the patient for this  visit:  Primary Reason for Visit (Submitted on 10/15/2023)  What is the primary reason for your visit?: Back Pain  Back Pain Questionnaire (Submitted on 10/15/2023)  Chief Complaint: Back pain  Chronicity: recurrent  Onset: 1 to 4 weeks ago  Frequency: every several days  Progression since onset: gradually improving  Pain location: sacro-iliac  Pain quality: aching  Radiates to: right foot, right knee, right thigh  Pain - numeric: 4/10  Pain is: worse during the night  Aggravated by: lying down, standing  Stiffness is present: in the morning  bladder incontinence: Yes  bowel incontinence: No  leg pain: Yes  paresis: No  paresthesias: No  perianal numbness: No  tingling: No  weight loss: No  Risk factors: lack of exercise, obesity, sedentary lifestyle

## 2023-11-07 ENCOUNTER — HOSPITAL ENCOUNTER (OUTPATIENT)
Dept: GENERAL RADIOLOGY | Facility: HOSPITAL | Age: 68
Discharge: HOME | End: 2023-11-07
Admitting: UROLOGY
Payer: MEDICARE

## 2023-11-07 ENCOUNTER — OFFICE VISIT (OUTPATIENT)
Dept: UROLOGY | Facility: CLINIC | Age: 68
End: 2023-11-07
Payer: MEDICARE

## 2023-11-07 VITALS
HEIGHT: 67 IN | SYSTOLIC BLOOD PRESSURE: 137 MMHG | BODY MASS INDEX: 42.22 KG/M2 | WEIGHT: 269 LBS | DIASTOLIC BLOOD PRESSURE: 82 MMHG | HEART RATE: 86 BPM

## 2023-11-07 DIAGNOSIS — N20.0 RENAL CALCULUS, LEFT: Primary | ICD-10-CM

## 2023-11-07 DIAGNOSIS — R31.9 URINARY TRACT INFECTION WITH HEMATURIA, SITE UNSPECIFIED: ICD-10-CM

## 2023-11-07 DIAGNOSIS — N39.0 URINARY TRACT INFECTION WITH HEMATURIA, SITE UNSPECIFIED: ICD-10-CM

## 2023-11-07 DIAGNOSIS — N20.0 RENAL CALCULUS, LEFT: ICD-10-CM

## 2023-11-07 LAB
BILIRUB BLD-MCNC: NEGATIVE MG/DL
CLARITY, POC: ABNORMAL
COLOR UR: YELLOW
EXPIRATION DATE: ABNORMAL
GLUCOSE UR STRIP-MCNC: ABNORMAL MG/DL
KETONES UR QL: NEGATIVE
LEUKOCYTE EST, POC: ABNORMAL
Lab: ABNORMAL
NITRITE UR-MCNC: NEGATIVE MG/ML
PH UR: 6 [PH] (ref 5–8)
PROT UR STRIP-MCNC: ABNORMAL MG/DL
RBC # UR STRIP: ABNORMAL /UL
SP GR UR: 1.01 (ref 1–1.03)
UROBILINOGEN UR QL: NORMAL

## 2023-11-07 PROCEDURE — 99024 POSTOP FOLLOW-UP VISIT: CPT | Performed by: UROLOGY

## 2023-11-07 PROCEDURE — 87086 URINE CULTURE/COLONY COUNT: CPT | Performed by: UROLOGY

## 2023-11-07 PROCEDURE — 1160F RVW MEDS BY RX/DR IN RCRD: CPT | Performed by: UROLOGY

## 2023-11-07 PROCEDURE — 81003 URINALYSIS AUTO W/O SCOPE: CPT | Performed by: UROLOGY

## 2023-11-07 PROCEDURE — 74018 RADEX ABDOMEN 1 VIEW: CPT | Performed by: RADIOLOGY

## 2023-11-07 PROCEDURE — 74018 RADEX ABDOMEN 1 VIEW: CPT

## 2023-11-07 PROCEDURE — 1159F MED LIST DOCD IN RCRD: CPT | Performed by: UROLOGY

## 2023-11-07 PROCEDURE — 96372 THER/PROPH/DIAG INJ SC/IM: CPT | Performed by: UROLOGY

## 2023-11-07 RX ORDER — GENTAMICIN SULFATE 40 MG/ML
80 INJECTION, SOLUTION INTRAMUSCULAR; INTRAVENOUS ONCE
Status: COMPLETED | OUTPATIENT
Start: 2023-11-07 | End: 2023-11-07

## 2023-11-07 RX ORDER — CIPROFLOXACIN 500 MG/1
500 TABLET, FILM COATED ORAL 2 TIMES DAILY
Qty: 6 TABLET | Refills: 0 | Status: SHIPPED | OUTPATIENT
Start: 2023-11-07

## 2023-11-07 RX ADMIN — GENTAMICIN SULFATE 80 MG: 40 INJECTION, SOLUTION INTRAMUSCULAR; INTRAVENOUS at 14:52

## 2023-11-07 NOTE — PROGRESS NOTES
Chief Complaint:      Chief Complaint   Patient presents with    Renal calculus, left       HPI:   68 y.o. female returns today status post a successful treatment.  She feels bad she has 3+ leukocytes in the urine.  No pain I put her on Cipro gave her a dose of gentamicin pending the results of the culture and I will see her back in 1 week if her pain or symptoms persist she will need a CT scan    Past Medical History:     Past Medical History:   Diagnosis Date    Anxiety and depression     Asthma, extrinsic     Diabetes mellitus     Hypertension     PONV (postoperative nausea and vomiting)     Rheumatoid arthritis     Sleep apnea     uses c-pap       Current Meds:     Current Outpatient Medications   Medication Sig Dispense Refill    albuterol sulfate  (90 Base) MCG/ACT inhaler Inhale 2 puffs Every 6 (Six) Hours As Needed for Wheezing. 18 g 11    bisoprolol-hydrochlorothiazide (ZIAC) 5-6.25 MG per tablet Take 1 tablet by mouth Daily.  2    cetirizine (zyrTEC) 10 MG tablet Take 1 tablet by mouth Daily.      dapagliflozin Propanediol (Farxiga) 10 MG tablet Take 10 mg by mouth Daily.      glimepiride (AMARYL) 2 MG tablet Take 1 tablet by mouth Every Morning Before Breakfast.      hydroxychloroquine (PLAQUENIL) 200 MG tablet Take 1 tablet by mouth 2 (Two) Times a Day.  1    Influenza Vac Subunit Quad (FLUCELVAX) 0.5 ML suspension prefilled syringe injection Flucelvax Quad 0611-5247 (PF) 60 mcg (15 mcg x 4)/0.5 mL IM syringe      montelukast (SINGULAIR) 10 MG tablet Take 1 tablet by mouth Every Evening.  5    Multiple Vitamins-Minerals (MULTIVITAMIN WITH MINERALS) tablet tablet Take 1 tablet by mouth Daily.      naproxen (NAPROSYN) 500 MG tablet naproxen 500 mg tablet      ondansetron ODT (ZOFRAN-ODT) 4 MG disintegrating tablet Place 1 tablet on the tongue Every 6 (Six) Hours As Needed for Nausea or Vomiting. 12 tablet 0    oxyCODONE-acetaminophen (Percocet)  MG per tablet Take 1 tablet by mouth Every 6  (Six) Hours As Needed for Moderate Pain. 10 tablet 0    oxyCODONE-acetaminophen (Percocet)  MG per tablet Take 1 tablet by mouth Every 6 (Six) Hours As Needed for Moderate Pain (Pain). 12 tablet 0    oxyCODONE-acetaminophen (PERCOCET) 7.5-325 MG per tablet Take 1 tablet by mouth Every 6 (Six) Hours As Needed for Moderate Pain. 12 tablet 0    sertraline (ZOLOFT) 100 MG tablet Take 1 tablet by mouth Daily.      tamsulosin (FLOMAX) 0.4 MG capsule 24 hr capsule Take 1 capsule by mouth Daily. 30 capsule 0     No current facility-administered medications for this visit.        Allergies:      Allergies   Allergen Reactions    Benzoyl Peroxide Hives    Humira [Adalimumab] Hives        Past Surgical History:     Past Surgical History:   Procedure Laterality Date    ABDOMINAL SURGERY      CARDIAC CATHETERIZATION      CARPAL TUNNEL RELEASE      COLONOSCOPY      ENDOSCOPY      EXTRACORPOREAL SHOCK WAVE LITHOTRIPSY (ESWL) Left 10/13/2023    Procedure: EXTRACORPOREAL SHOCKWAVE LITHOTRIPSY;  Surgeon: Prudencio Helton MD;  Location: [facility];  Service: Urology;  Laterality: Left;    GALLBLADDER SURGERY      HIP BIPOLAR REPLACEMENT Bilateral     HYSTERECTOMY      JOINT REPLACEMENT      SKIN BIOPSY      TONSILLECTOMY         Social History:     Social History     Socioeconomic History    Marital status:    Tobacco Use    Smoking status: Never    Smokeless tobacco: Never   Vaping Use    Vaping Use: Never used   Substance and Sexual Activity    Alcohol use: No    Drug use: No    Sexual activity: Defer       Family History:     Family History   Problem Relation Age of Onset    Diabetes Mother     Diabetes Father     Heart failure Father     Diabetes Sister     Diabetes Brother        Review of Systems:     Review of Systems   Constitutional: Negative.  Negative for activity change, appetite change, chills, diaphoresis, fatigue and unexpected weight change.   HENT:  Negative for congestion, dental problem, drooling,  ear discharge, ear pain, facial swelling, hearing loss, mouth sores, nosebleeds, postnasal drip, rhinorrhea, sinus pressure, sneezing, sore throat, tinnitus, trouble swallowing and voice change.    Eyes: Negative.  Negative for photophobia, pain, discharge, redness, itching and visual disturbance.   Respiratory: Negative.  Negative for apnea, cough, choking, chest tightness, shortness of breath, wheezing and stridor.    Cardiovascular: Negative.  Negative for chest pain, palpitations and leg swelling.   Gastrointestinal: Negative.  Negative for abdominal distention, abdominal pain, anal bleeding, blood in stool, constipation, diarrhea, nausea, rectal pain and vomiting.   Endocrine: Negative.  Negative for cold intolerance, heat intolerance, polydipsia, polyphagia and polyuria.   Musculoskeletal: Negative.  Negative for arthralgias, back pain, gait problem, joint swelling, myalgias, neck pain and neck stiffness.   Skin: Negative.  Negative for color change, pallor, rash and wound.   Allergic/Immunologic: Negative.  Negative for environmental allergies, food allergies and immunocompromised state.   Neurological: Negative.  Negative for dizziness, tremors, seizures, syncope, facial asymmetry, speech difficulty, weakness, light-headedness, numbness and headaches.   Hematological: Negative.  Negative for adenopathy. Does not bruise/bleed easily.   Psychiatric/Behavioral:  Negative for agitation, behavioral problems, confusion, decreased concentration, dysphoric mood, hallucinations, self-injury, sleep disturbance and suicidal ideas. The patient is not nervous/anxious and is not hyperactive.    All other systems reviewed and are negative.      Physical Exam:     Physical Exam  Constitutional:       Appearance: She is well-developed.   HENT:      Head: Normocephalic and atraumatic.      Right Ear: External ear normal.      Left Ear: External ear normal.   Eyes:      Conjunctiva/sclera: Conjunctivae normal.      Pupils:  Pupils are equal, round, and reactive to light.   Cardiovascular:      Rate and Rhythm: Normal rate and regular rhythm.      Heart sounds: Normal heart sounds.   Pulmonary:      Effort: Pulmonary effort is normal.      Breath sounds: Normal breath sounds.   Abdominal:      General: Bowel sounds are normal. There is no distension.      Palpations: Abdomen is soft. There is no mass.      Tenderness: There is no abdominal tenderness. There is no guarding or rebound.   Genitourinary:     Vagina: No vaginal discharge.   Musculoskeletal:         General: Normal range of motion.   Skin:     General: Skin is warm and dry.   Neurological:      Mental Status: She is alert.      Deep Tendon Reflexes: Reflexes are normal and symmetric.   Psychiatric:         Behavior: Behavior normal.         Thought Content: Thought content normal.         Judgment: Judgment normal.         I have reviewed the following portions of the patient's history: Allergies, current medications, past family history, past medical history, past social history, past surgical history, problem list, and ROS and confirm it is accurate.    Recent Image (CT and/or KUB):      CT Abdomen and Pelvis: No results found for this or any previous visit.       CT Stone Protocol: Results for orders placed during the hospital encounter of 09/30/23    CT Abdomen Pelvis Stone Protocol    Narrative  PROCEDURE: CT of the abdomen and pelvis performed without IV contrast on  September 30, 2023. The examination was performed with 4 mm axial  imaging and 4 mm sagittal and coronal reconstruction images. Total DLP =  1672. The examination was performed according to as low as reasonably  achievable dose protocol.    HISTORY: Bilateral flank pain.    FINDINGS:  Normal heart size with no pericardial effusion.  Bilateral calcified granulomas.  Fatty infiltration of the liver.  Cholecystectomy clips in the right upper quadrant with mild ectatic  common bile duct.  There is a 7 x 14.3 mm  stone at the left UPJ with features of partial  left renal obstruction. The stone is seen best on image 54, series 2 and  there is mild to moderate stranding around the left kidney.  Bilateral nephrolithiasis.  No obstructing stone identified on the right side.  Right and left hip arthroplasty.  Normal appendix.  No bowel or right renal obstruction.  No acute process seen in the bladder.  No abdominal aortic aneurysm or retroperitoneal hemorrhage.  Multilevel degenerative disc disease throughout the lumbar spine with  small posterior bulging discs in the mid and lower lumbar spine levels.  Streak artifacts from the right and left hip arthroplasty limited  assessment of the lower pelvis.  Prior hysterectomy suggested.    Impression  1.  There is a 7 x 14.3 mm stone at the left UPJ with associated partial  left renal obstruction.  2.  Bilateral nephrolithiasis.  3.  Cholecystectomy.  4.  Fatty infiltration of the liver.  5.  Normal appendix.  6.  Hysterectomy.  7.  Degenerative disc disease in the lumbar spine and lower thoracic  spine.  8.  No free fluid or free air.    This report was finalized on 9/30/2023 2:40 PM by Arnulfo Arellano MD.       KUB: Results for orders placed during the hospital encounter of 10/17/23    XR Abdomen KUB    Narrative  EXAM:  XR Abdomen, 1 View    EXAM DATE:  10/17/2023 2:35 PM    CLINICAL HISTORY:  FLANK PAIN; R10.9-Unspecified abdominal pain    TECHNIQUE:  Frontal supine view of the abdomen/pelvis.    COMPARISON:  No relevant prior studies available.    FINDINGS:  Gastrointestinal tract:  Unremarkable as visualized.  No dilation.  Organs:  Nonobstructing left kidney stones are noted.  Calcifications  in the left lower pelvis may represent distal ureter stones, largest of  which is 4.5 mm.  Bones/joints:  Bilateral total hip arthroplasty is noted.    Impression  1.  Nonobstructing left kidney stones are noted.  2.  Calcifications in the left lower pelvis may represent distal  ureter  stones, largest of which is 4.5 mm.      This report was finalized on 10/17/2023 1:47 PM by Dr. Reinaldo Shepard MD.       Labs (past 3 months):      Admission on 10/13/2023, Discharged on 10/13/2023   Component Date Value Ref Range Status    Glucose 10/13/2023 132 (H)  70 - 130 mg/dL Final   Office Visit on 10/02/2023   Component Date Value Ref Range Status    Color 10/02/2023 Yellow  Yellow, Straw, Dark Yellow, Kalli Final    Clarity, UA 10/02/2023 Cloudy (A)  Clear Final    Specific Gravity  10/02/2023 1.010  1.005 - 1.030 Final    pH, Urine 10/02/2023 5.5  5.0 - 8.0 Final    Leukocytes 10/02/2023 Moderate (2+) (A)  Negative Final    Nitrite, UA 10/02/2023 Negative  Negative Final    Protein, POC 10/02/2023 1+ (A)  Negative mg/dL Final    Glucose, UA 10/02/2023 3+ (A)  Negative mg/dL Final    Ketones, UA 10/02/2023 Negative  Negative Final    Urobilinogen, UA 10/02/2023 Normal  Normal, 0.2 E.U./dL Final    Bilirubin 10/02/2023 Negative  Negative Final    Blood, UA 10/02/2023 3+ (A)  Negative Final    Lot Number 10/02/2023 n   Final    Expiration Date 10/02/2023 n   Final    Urine Culture 10/02/2023 No growth   Final   Admission on 09/30/2023, Discharged on 09/30/2023   Component Date Value Ref Range Status    Glucose 09/30/2023 183 (H)  65 - 99 mg/dL Final    BUN 09/30/2023 12  8 - 23 mg/dL Final    Creatinine 09/30/2023 0.81  0.57 - 1.00 mg/dL Final    Sodium 09/30/2023 140  136 - 145 mmol/L Final    Potassium 09/30/2023 4.1  3.5 - 5.2 mmol/L Final    Chloride 09/30/2023 104  98 - 107 mmol/L Final    CO2 09/30/2023 21.6 (L)  22.0 - 29.0 mmol/L Final    Calcium 09/30/2023 9.2  8.6 - 10.5 mg/dL Final    Total Protein 09/30/2023 7.1  6.0 - 8.5 g/dL Final    Albumin 09/30/2023 4.0  3.5 - 5.2 g/dL Final    ALT (SGPT) 09/30/2023 73 (H)  1 - 33 U/L Final    AST (SGOT) 09/30/2023 60 (H)  1 - 32 U/L Final    Alkaline Phosphatase 09/30/2023 54  39 - 117 U/L Final    Total Bilirubin 09/30/2023 0.4  0.0 - 1.2 mg/dL  Final    Globulin 09/30/2023 3.1  gm/dL Final    A/G Ratio 09/30/2023 1.3  g/dL Final    BUN/Creatinine Ratio 09/30/2023 14.8  7.0 - 25.0 Final    Anion Gap 09/30/2023 14.4  5.0 - 15.0 mmol/L Final    eGFR 09/30/2023 79.7  >60.0 mL/min/1.73 Final    Color, UA 09/30/2023 Yellow  Yellow, Straw Final    Appearance, UA 09/30/2023 Cloudy (A)  Clear Final    pH, UA 09/30/2023 <=5.0  5.0 - 8.0 Final    Specific Gravity, UA 09/30/2023 1.028  1.005 - 1.030 Final    Glucose, UA 09/30/2023 >=1000 mg/dL (3+) (A)  Negative Final    Ketones, UA 09/30/2023 Negative  Negative Final    Bilirubin, UA 09/30/2023 Negative  Negative Final    Blood, UA 09/30/2023 Large (3+) (A)  Negative Final    Protein, UA 09/30/2023 100 mg/dL (2+) (A)  Negative Final    Leuk Esterase, UA 09/30/2023 Moderate (2+) (A)  Negative Final    Nitrite, UA 09/30/2023 Positive (A)  Negative Final    Urobilinogen, UA 09/30/2023 0.2 E.U./dL  0.2 - 1.0 E.U./dL Final    WBC 09/30/2023 14.82 (H)  3.40 - 10.80 10*3/mm3 Final    RBC 09/30/2023 4.99  3.77 - 5.28 10*6/mm3 Final    Hemoglobin 09/30/2023 14.2  12.0 - 15.9 g/dL Final    Hematocrit 09/30/2023 44.7  34.0 - 46.6 % Final    MCV 09/30/2023 89.6  79.0 - 97.0 fL Final    MCH 09/30/2023 28.5  26.6 - 33.0 pg Final    MCHC 09/30/2023 31.8  31.5 - 35.7 g/dL Final    RDW 09/30/2023 13.6  12.3 - 15.4 % Final    RDW-SD 09/30/2023 44.6  37.0 - 54.0 fl Final    MPV 09/30/2023 9.4  6.0 - 12.0 fL Final    Platelets 09/30/2023 206  140 - 450 10*3/mm3 Final    Neutrophil % 09/30/2023 85.9 (H)  42.7 - 76.0 % Final    Lymphocyte % 09/30/2023 4.3 (L)  19.6 - 45.3 % Final    Monocyte % 09/30/2023 8.6  5.0 - 12.0 % Final    Eosinophil % 09/30/2023 0.5  0.3 - 6.2 % Final    Basophil % 09/30/2023 0.4  0.0 - 1.5 % Final    Immature Grans % 09/30/2023 0.3  0.0 - 0.5 % Final    Neutrophils, Absolute 09/30/2023 12.72 (H)  1.70 - 7.00 10*3/mm3 Final    Lymphocytes, Absolute 09/30/2023 0.64 (L)  0.70 - 3.10 10*3/mm3 Final    Monocytes,  Absolute 09/30/2023 1.27 (H)  0.10 - 0.90 10*3/mm3 Final    Eosinophils, Absolute 09/30/2023 0.08  0.00 - 0.40 10*3/mm3 Final    Basophils, Absolute 09/30/2023 0.06  0.00 - 0.20 10*3/mm3 Final    Immature Grans, Absolute 09/30/2023 0.05  0.00 - 0.05 10*3/mm3 Final    nRBC 09/30/2023 0.0  0.0 - 0.2 /100 WBC Final    Extra Tube 09/30/2023 Hold for add-ons.   Final    Auto resulted.    Extra Tube 09/30/2023 hold for add-on   Final    Auto resulted    Extra Tube 09/30/2023 Hold for add-ons.   Final    Auto resulted.    Extra Tube 09/30/2023 Hold for add-ons.   Final    Auto resulted    RBC, UA 09/30/2023 13-20 (A)  None Seen, 0-2 /HPF Final    WBC, UA 09/30/2023 Too Numerous to Count (A)  None Seen, 0-2 /HPF Final    Bacteria, UA 09/30/2023 2+ (A)  None Seen /HPF Final    Squamous Epithelial Cells, UA 09/30/2023 None Seen  None Seen, 0-2 /HPF Final    Hyaline Casts, UA 09/30/2023 None Seen  None Seen /LPF Final    WBC Clumps, UA 09/30/2023 Small/1+  None Seen /HPF Final    Methodology 09/30/2023 Manual Light Microscopy   Final    Blood Culture 09/30/2023 No growth at 5 days   Final    Blood Culture 09/30/2023 No growth at 5 days   Final    Urine Culture 09/30/2023 >100,000 CFU/mL Escherichia coli (A)   Final    Lactate 09/30/2023 1.7  0.5 - 2.0 mmol/L Final        Procedure:       Assessment/Plan:   Recurrent urinary tract infections-patient has been referred and diagnosed with recurrent urinary tract infections.  We discussed the types of organisms that are found in the urinary tract indicating that the vast majority are results of the patient's own gastrointestinal sheila.  We discussed how many of the antibiotics that are utilized can actually exacerbate these infections by creating resistant organisms and there is only very few antibiotics that are concentrated in the urine and do not affect the rectal reservoir nor cause recurrent yeast vaginitis.  We discussed the risk factors for recurrent infections being  intercourse in younger patients and atrophic changes in older patients.  We discussed the symptoms that are found including pain, pressure, burning, frequency, urgency, suprapubic pain, and painful intercourse.  I discussed upper tract symptoms including fevers and chills and indicated the workup would be much more aggressive if the patient were to present with recurrent infections in the face of upper tract symptomatology such as fever.  I discussed the history of vesicoureteral reflux in young patients and finally chronic renal scarring as a result of such.  I recommend concomitant probiotics with treatment with antibiotics to protect the rectal reservoir including over-the-counter yogurt preparations to usman oral pills containing the appropriate probiotics.  Given prophylactic gentamicin and treatment with Cipro pending the results of the culture  Flank pain if symptoms persist I will recommend a stone CT    Patient reports that she is not currently experiencing any symptoms of urinary incontinence.                    This document has been electronically signed by LAN RIOS MD November 7, 2023 13:24 EST    Dictated Utilizing Dragon Dictation: Part of this note may be an electronic transcription/translation of spoken language to printed text using the Dragon Dictation System.

## 2023-11-08 LAB — BACTERIA SPEC AEROBE CULT: NORMAL

## 2023-11-09 PROBLEM — N39.0 URINARY TRACT INFECTION WITH HEMATURIA: Status: ACTIVE | Noted: 2023-11-09

## 2023-11-09 PROBLEM — R31.9 URINARY TRACT INFECTION WITH HEMATURIA: Status: ACTIVE | Noted: 2023-11-09

## 2023-11-14 ENCOUNTER — OFFICE VISIT (OUTPATIENT)
Dept: UROLOGY | Facility: CLINIC | Age: 68
End: 2023-11-14
Payer: MEDICARE

## 2023-11-14 VITALS
HEIGHT: 67 IN | WEIGHT: 267 LBS | HEART RATE: 76 BPM | SYSTOLIC BLOOD PRESSURE: 153 MMHG | DIASTOLIC BLOOD PRESSURE: 110 MMHG | BODY MASS INDEX: 41.91 KG/M2

## 2023-11-14 DIAGNOSIS — N20.0 RENAL CALCULUS, LEFT: Primary | ICD-10-CM

## 2023-11-14 NOTE — PROGRESS NOTES
Chief Complaint:      Chief Complaint   Patient presents with    Renal calculus, left       HPI:   68 y.o. female doing much better, feels better, still slight dysuria, less than 25,000 and the growth on a culture.  No pain.  KUB was negative.  I will see her back in 6 months I gave her reassurance.    Past Medical History:     Past Medical History:   Diagnosis Date    Anxiety and depression     Asthma, extrinsic     Diabetes mellitus     Hypertension     PONV (postoperative nausea and vomiting)     Rheumatoid arthritis     Sleep apnea     uses c-pap       Current Meds:     Current Outpatient Medications   Medication Sig Dispense Refill    albuterol sulfate  (90 Base) MCG/ACT inhaler Inhale 2 puffs Every 6 (Six) Hours As Needed for Wheezing. 18 g 11    bisoprolol-hydrochlorothiazide (ZIAC) 5-6.25 MG per tablet Take 1 tablet by mouth Daily.  2    cetirizine (zyrTEC) 10 MG tablet Take 1 tablet by mouth Daily.      ciprofloxacin (Cipro) 500 MG tablet Take 1 tablet by mouth 2 (Two) Times a Day. 6 tablet 0    glimepiride (AMARYL) 2 MG tablet Take 1 tablet by mouth Every Morning Before Breakfast.      hydroxychloroquine (PLAQUENIL) 200 MG tablet Take 1 tablet by mouth 2 (Two) Times a Day.  1    Influenza Vac Subunit Quad (FLUCELVAX) 0.5 ML suspension prefilled syringe injection Flucelvax Quad [id number] (PF) 60 mcg (15 mcg x 4)/0.5 mL IM syringe      montelukast (SINGULAIR) 10 MG tablet Take 1 tablet by mouth Every Evening.  5    Multiple Vitamins-Minerals (MULTIVITAMIN WITH MINERALS) tablet tablet Take 1 tablet by mouth Daily.      naproxen (NAPROSYN) 500 MG tablet naproxen 500 mg tablet      ondansetron ODT (ZOFRAN-ODT) 4 MG disintegrating tablet Place 1 tablet on the tongue Every 6 (Six) Hours As Needed for Nausea or Vomiting. 12 tablet 0    oxyCODONE-acetaminophen (Percocet)  MG per tablet Take 1 tablet by mouth Every 6 (Six) Hours As Needed for Moderate Pain (Pain). 12 tablet 0    oxyCODONE-acetaminophen  (PERCOCET) 7.5-325 MG per tablet Take 1 tablet by mouth Every 6 (Six) Hours As Needed for Moderate Pain. 12 tablet 0    sertraline (ZOLOFT) 100 MG tablet Take 1 tablet by mouth Daily.      dapagliflozin Propanediol (Farxiga) 10 MG tablet Take 10 mg by mouth Daily.      oxyCODONE-acetaminophen (Percocet)  MG per tablet Take 1 tablet by mouth Every 6 (Six) Hours As Needed for Moderate Pain. 10 tablet 0    tamsulosin (FLOMAX) 0.4 MG capsule 24 hr capsule Take 1 capsule by mouth Daily. 30 capsule 0     No current facility-administered medications for this visit.        Allergies:      Allergies   Allergen Reactions    Benzoyl Peroxide Hives    Humira [Adalimumab] Hives        Past Surgical History:     Past Surgical History:   Procedure Laterality Date    ABDOMINAL SURGERY      CARDIAC CATHETERIZATION      CARPAL TUNNEL RELEASE      COLONOSCOPY      ENDOSCOPY      EXTRACORPOREAL SHOCK WAVE LITHOTRIPSY (ESWL) Left 10/13/2023    Procedure: EXTRACORPOREAL SHOCKWAVE LITHOTRIPSY;  Surgeon: Prudencio Helton MD;  Location: [facility];  Service: Urology;  Laterality: Left;    GALLBLADDER SURGERY      HIP BIPOLAR REPLACEMENT Bilateral     HYSTERECTOMY      JOINT REPLACEMENT      SKIN BIOPSY      TONSILLECTOMY         Social History:     Social History     Socioeconomic History    Marital status:    Tobacco Use    Smoking status: Never    Smokeless tobacco: Never   Vaping Use    Vaping Use: Never used   Substance and Sexual Activity    Alcohol use: No    Drug use: No    Sexual activity: Defer       Family History:     Family History   Problem Relation Age of Onset    Diabetes Mother     Diabetes Father     Heart failure Father     Diabetes Sister     Diabetes Brother        Review of Systems:     Review of Systems   Constitutional:  Negative for chills, fatigue and fever.   HENT: Negative.     Eyes: Negative.    Respiratory:  Negative for cough, shortness of breath and wheezing.    Cardiovascular:  Negative for  leg swelling.   Gastrointestinal:  Negative for abdominal pain, nausea and vomiting.   Endocrine: Negative.    Genitourinary:  Negative for difficulty urinating, dysuria, enuresis, frequency, pelvic pain and urgency.   Musculoskeletal:  Negative for back pain and joint swelling.   Skin: Negative.    Allergic/Immunologic: Negative.    Neurological:  Negative for dizziness and headaches.   Hematological: Negative.    Psychiatric/Behavioral:  Negative for confusion.      Physical Exam:     Physical Exam  Constitutional:       Appearance: She is well-developed.   HENT:      Head: Normocephalic and atraumatic.      Right Ear: External ear normal.      Left Ear: External ear normal.   Eyes:      Conjunctiva/sclera: Conjunctivae normal.      Pupils: Pupils are equal, round, and reactive to light.   Cardiovascular:      Rate and Rhythm: Normal rate and regular rhythm.      Heart sounds: Normal heart sounds.   Pulmonary:      Effort: Pulmonary effort is normal.      Breath sounds: Normal breath sounds.   Abdominal:      General: Bowel sounds are normal. There is no distension.      Palpations: Abdomen is soft. There is no mass.      Tenderness: There is no abdominal tenderness. There is no guarding or rebound.   Genitourinary:     Vagina: No vaginal discharge.   Musculoskeletal:         General: Normal range of motion.   Skin:     General: Skin is warm and dry.   Neurological:      Mental Status: She is alert.      Deep Tendon Reflexes: Reflexes are normal and symmetric.   Psychiatric:         Behavior: Behavior normal.         Thought Content: Thought content normal.         Judgment: Judgment normal.         I have reviewed the following portions of the patient's history: Allergies, current medications, past family history, past medical history, past social history, past surgical history, problem list, and ROS and confirm it is accurate.    Recent Image (CT and/or KUB):      CT Abdomen and Pelvis: No results found for this  or any previous visit.       CT Stone Protocol: Results for orders placed during the hospital encounter of 09/30/23    CT Abdomen Pelvis Stone Protocol    Narrative  PROCEDURE: CT of the abdomen and pelvis performed without IV contrast on  September 30, 2023. The examination was performed with 4 mm axial  imaging and 4 mm sagittal and coronal reconstruction images. Total DLP =  1672. The examination was performed according to as low as reasonably  achievable dose protocol.    HISTORY: Bilateral flank pain.    FINDINGS:  Normal heart size with no pericardial effusion.  Bilateral calcified granulomas.  Fatty infiltration of the liver.  Cholecystectomy clips in the right upper quadrant with mild ectatic  common bile duct.  There is a 7 x 14.3 mm stone at the left UPJ with features of partial  left renal obstruction. The stone is seen best on image 54, series 2 and  there is mild to moderate stranding around the left kidney.  Bilateral nephrolithiasis.  No obstructing stone identified on the right side.  Right and left hip arthroplasty.  Normal appendix.  No bowel or right renal obstruction.  No acute process seen in the bladder.  No abdominal aortic aneurysm or retroperitoneal hemorrhage.  Multilevel degenerative disc disease throughout the lumbar spine with  small posterior bulging discs in the mid and lower lumbar spine levels.  Streak artifacts from the right and left hip arthroplasty limited  assessment of the lower pelvis.  Prior hysterectomy suggested.    Impression  1.  There is a 7 x 14.3 mm stone at the left UPJ with associated partial  left renal obstruction.  2.  Bilateral nephrolithiasis.  3.  Cholecystectomy.  4.  Fatty infiltration of the liver.  5.  Normal appendix.  6.  Hysterectomy.  7.  Degenerative disc disease in the lumbar spine and lower thoracic  spine.  8.  No free fluid or free air.    This report was finalized on 9/30/2023 2:40 PM by Arnulfo Arellano MD.       KUB: Results for orders placed  during the hospital encounter of 11/07/23    XR Abdomen KUB    Narrative  EXAM:  XR Abdomen, 1 View    EXAM DATE:  11/7/2023 3:03 PM    CLINICAL HISTORY:  kidney stone; N20.0-Calculus of kidney    TECHNIQUE:  Frontal supine view of the abdomen/pelvis.    COMPARISON:  10/17/2023    FINDINGS:  Gastrointestinal tract:  Unremarkable as visualized.  No dilation.  Organs:  Calcifications in the left lower pelvis may represent distal  ureteral stones.  Left kidney stones poorly assessed due to overlying  bowel gas artifact.  Bones/joints:  Bilateral hip arthroplasty.    Impression  1.  Calcifications left lower pelvis may represent distal ureteral  stones.  2.  Limited assessment of the left kidney due to overlying bowel gas.      This report was finalized on 11/7/2023 2:24 PM by Dr. Reinaldo Shepard MD.       Labs (past 3 months):      Office Visit on 11/07/2023   Component Date Value Ref Range Status    Color 11/07/2023 Yellow  Yellow, Straw, Dark Yellow, Kalli Final    Clarity, UA 11/07/2023 Cloudy (A)  Clear Final    Specific Gravity  11/07/2023 1.015  1.005 - 1.030 Final    pH, Urine 11/07/2023 6.0  5.0 - 8.0 Final    Leukocytes 11/07/2023 500 Nilesh/ul (A)  Negative Final    Nitrite, UA 11/07/2023 Negative  Negative Final    Protein, POC 11/07/2023 1+ (A)  Negative mg/dL Final    Glucose, UA 11/07/2023 3+ (A)  Negative mg/dL Final    Ketones, UA 11/07/2023 Negative  Negative Final    Urobilinogen, UA 11/07/2023 Normal  Normal, 0.2 E.U./dL Final    Bilirubin 11/07/2023 Negative  Negative Final    Blood, UA 11/07/2023 3+ (A)  Negative Final    Lot Number 11/07/2023 [id number]   Final    Expiration Date 11/07/2023 102,024   Final    Urine Culture 11/07/2023 <25,000 CFU/mL Mixed Ching Isolated   Final   Admission on 10/13/2023, Discharged on 10/13/2023   Component Date Value Ref Range Status    Glucose 10/13/2023 132 (H)  70 - 130 mg/dL Final   Office Visit on 10/02/2023   Component Date Value Ref Range Status    Color  10/02/2023 Yellow  Yellow, Straw, Dark Yellow, Kalli Final    Clarity, UA 10/02/2023 Cloudy (A)  Clear Final    Specific Gravity  10/02/2023 1.010  1.005 - 1.030 Final    pH, Urine 10/02/2023 5.5  5.0 - 8.0 Final    Leukocytes 10/02/2023 Moderate (2+) (A)  Negative Final    Nitrite, UA 10/02/2023 Negative  Negative Final    Protein, POC 10/02/2023 1+ (A)  Negative mg/dL Final    Glucose, UA 10/02/2023 3+ (A)  Negative mg/dL Final    Ketones, UA 10/02/2023 Negative  Negative Final    Urobilinogen, UA 10/02/2023 Normal  Normal, 0.2 E.U./dL Final    Bilirubin 10/02/2023 Negative  Negative Final    Blood, UA 10/02/2023 3+ (A)  Negative Final    Lot Number 10/02/2023 n   Final    Expiration Date 10/02/2023 n   Final    Urine Culture 10/02/2023 No growth   Final   Admission on 09/30/2023, Discharged on 09/30/2023   Component Date Value Ref Range Status    Glucose 09/30/2023 183 (H)  65 - 99 mg/dL Final    BUN 09/30/2023 12  8 - 23 mg/dL Final    Creatinine 09/30/2023 0.81  0.57 - 1.00 mg/dL Final    Sodium 09/30/2023 140  136 - 145 mmol/L Final    Potassium 09/30/2023 4.1  3.5 - 5.2 mmol/L Final    Chloride 09/30/2023 104  98 - 107 mmol/L Final    CO2 09/30/2023 21.6 (L)  22.0 - 29.0 mmol/L Final    Calcium 09/30/2023 9.2  8.6 - 10.5 mg/dL Final    Total Protein 09/30/2023 7.1  6.0 - 8.5 g/dL Final    Albumin 09/30/2023 4.0  3.5 - 5.2 g/dL Final    ALT (SGPT) 09/30/2023 73 (H)  1 - 33 U/L Final    AST (SGOT) 09/30/2023 60 (H)  1 - 32 U/L Final    Alkaline Phosphatase 09/30/2023 54  39 - 117 U/L Final    Total Bilirubin 09/30/2023 0.4  0.0 - 1.2 mg/dL Final    Globulin 09/30/2023 3.1  gm/dL Final    A/G Ratio 09/30/2023 1.3  g/dL Final    BUN/Creatinine Ratio 09/30/2023 14.8  7.0 - 25.0 Final    Anion Gap 09/30/2023 14.4  5.0 - 15.0 mmol/L Final    eGFR 09/30/2023 79.7  >60.0 mL/min/1.73 Final    Color, UA 09/30/2023 Yellow  Yellow, Straw Final    Appearance, UA 09/30/2023 Cloudy (A)  Clear Final    pH, UA 09/30/2023 <=5.0   5.0 - 8.0 Final    Specific Gravity, UA 09/30/2023 1.028  1.005 - 1.030 Final    Glucose, UA 09/30/2023 >=1000 mg/dL (3+) (A)  Negative Final    Ketones, UA 09/30/2023 Negative  Negative Final    Bilirubin, UA 09/30/2023 Negative  Negative Final    Blood, UA 09/30/2023 Large (3+) (A)  Negative Final    Protein, UA 09/30/2023 100 mg/dL (2+) (A)  Negative Final    Leuk Esterase, UA 09/30/2023 Moderate (2+) (A)  Negative Final    Nitrite, UA 09/30/2023 Positive (A)  Negative Final    Urobilinogen, UA 09/30/2023 0.2 E.U./dL  0.2 - 1.0 E.U./dL Final    WBC 09/30/2023 14.82 (H)  3.40 - 10.80 10*3/mm3 Final    RBC 09/30/2023 4.99  3.77 - 5.28 10*6/mm3 Final    Hemoglobin 09/30/2023 14.2  12.0 - 15.9 g/dL Final    Hematocrit 09/30/2023 44.7  34.0 - 46.6 % Final    MCV 09/30/2023 89.6  79.0 - 97.0 fL Final    MCH 09/30/2023 28.5  26.6 - 33.0 pg Final    MCHC 09/30/2023 31.8  31.5 - 35.7 g/dL Final    RDW 09/30/2023 13.6  12.3 - 15.4 % Final    RDW-SD 09/30/2023 44.6  37.0 - 54.0 fl Final    MPV 09/30/2023 9.4  6.0 - 12.0 fL Final    Platelets 09/30/2023 206  140 - 450 10*3/mm3 Final    Neutrophil % 09/30/2023 85.9 (H)  42.7 - 76.0 % Final    Lymphocyte % 09/30/2023 4.3 (L)  19.6 - 45.3 % Final    Monocyte % 09/30/2023 8.6  5.0 - 12.0 % Final    Eosinophil % 09/30/2023 0.5  0.3 - 6.2 % Final    Basophil % 09/30/2023 0.4  0.0 - 1.5 % Final    Immature Grans % 09/30/2023 0.3  0.0 - 0.5 % Final    Neutrophils, Absolute 09/30/2023 12.72 (H)  1.70 - 7.00 10*3/mm3 Final    Lymphocytes, Absolute 09/30/2023 0.64 (L)  0.70 - 3.10 10*3/mm3 Final    Monocytes, Absolute 09/30/2023 1.27 (H)  0.10 - 0.90 10*3/mm3 Final    Eosinophils, Absolute 09/30/2023 0.08  0.00 - 0.40 10*3/mm3 Final    Basophils, Absolute 09/30/2023 0.06  0.00 - 0.20 10*3/mm3 Final    Immature Grans, Absolute 09/30/2023 0.05  0.00 - 0.05 10*3/mm3 Final    nRBC 09/30/2023 0.0  0.0 - 0.2 /100 WBC Final    Extra Tube 09/30/2023 Hold for add-ons.   Final    Auto resulted.     Extra Tube 09/30/2023 hold for add-on   Final    Auto resulted    Extra Tube 09/30/2023 Hold for add-ons.   Final    Auto resulted.    Extra Tube 09/30/2023 Hold for add-ons.   Final    Auto resulted    RBC, UA 09/30/2023 13-20 (A)  None Seen, 0-2 /HPF Final    WBC, UA 09/30/2023 Too Numerous to Count (A)  None Seen, 0-2 /HPF Final    Bacteria, UA 09/30/2023 2+ (A)  None Seen /HPF Final    Squamous Epithelial Cells, UA 09/30/2023 None Seen  None Seen, 0-2 /HPF Final    Hyaline Casts, UA 09/30/2023 None Seen  None Seen /LPF Final    WBC Clumps, UA 09/30/2023 Small/1+  None Seen /HPF Final    Methodology 09/30/2023 Manual Light Microscopy   Final    Blood Culture 09/30/2023 No growth at 5 days   Final    Blood Culture 09/30/2023 No growth at 5 days   Final    Urine Culture 09/30/2023 >100,000 CFU/mL Escherichia coli (A)   Final    Lactate 09/30/2023 1.7  0.5 - 2.0 mmol/L Final        Procedure:       Assessment/Plan:   Renal calculus-we discussed the presence of the stone. We discussed the various therapeutic options available including percutaneous nephrostolithotomy, ureteroscopy and extracorporeal shockwave  lithotripsy.  We discussed the risks of lithotripsy including the passage of stones, the development of a large string of stones in the distal ureter known as Steinstrasse.  In the 3% incidence of that we will need to proceed with a ureteroscopy for obstructing fragments.  Extremely rare incidence of renal hematoma and the significance of this.  We discussed percutaneous nephrostolithotomy and its use as well as the risks and benefits such as the need for postoperative hospitalization, and the risk of damage to the kidney and the remote risk of a nephrectomy.  We also discussed the use of ureteroscopy in the upper tracts.  That this is as a decreased success rate to completely remove the stones on the first option and that very likely a stent will be required requiring an additional procedure for removal.   We discussed the absolute relative indicators for intervention including the presence of sepsis and pain we cannot control ais the primary need for urgent intervention.  We discussed placement of a stent if indicated and the management of the stent as well.  Status post lithotripsy with some residual stones but no pain            This document has been electronically signed by LAN RIOS MD November 14, 2023 14:48 EST    Dictated Utilizing Dragon Dictation: Part of this note may be an electronic transcription/translation of spoken language to printed text using the Dragon Dictation System.

## 2024-05-10 RX ORDER — HYDROXYCHLOROQUINE SULFATE 200 MG/1
200 TABLET, FILM COATED ORAL 2 TIMES DAILY
Qty: 180 TABLET | Refills: 0 | OUTPATIENT
Start: 2024-05-10 | End: 2024-08-08

## 2024-05-14 ENCOUNTER — TELEPHONE (OUTPATIENT)
Dept: UROLOGY | Facility: CLINIC | Age: 69
End: 2024-05-14

## 2024-05-14 NOTE — TELEPHONE ENCOUNTER
Caller: ROYA CRUM    Relationship to patient: SELF    Best call back number [phone]    Chief complaint: SAME DAY CANCELLATION WITH RESCHEDULE TO MAY 28TH    Type of visit: FOLLOW UP

## 2024-05-28 ENCOUNTER — OFFICE VISIT (OUTPATIENT)
Dept: UROLOGY | Facility: CLINIC | Age: 69
End: 2024-05-28
Payer: MEDICARE

## 2024-05-28 VITALS
HEIGHT: 67 IN | BODY MASS INDEX: 42.82 KG/M2 | HEART RATE: 78 BPM | SYSTOLIC BLOOD PRESSURE: 185 MMHG | WEIGHT: 272.8 LBS | DIASTOLIC BLOOD PRESSURE: 100 MMHG

## 2024-05-28 DIAGNOSIS — R31.9 URINARY TRACT INFECTION WITH HEMATURIA, SITE UNSPECIFIED: ICD-10-CM

## 2024-05-28 DIAGNOSIS — N20.0 RENAL CALCULUS, LEFT: Primary | ICD-10-CM

## 2024-05-28 DIAGNOSIS — N39.0 URINARY TRACT INFECTION WITH HEMATURIA, SITE UNSPECIFIED: ICD-10-CM

## 2024-05-28 LAB
BILIRUB BLD-MCNC: NEGATIVE MG/DL
CLARITY, POC: CLEAR
COLOR UR: ABNORMAL
GLUCOSE UR STRIP-MCNC: NEGATIVE MG/DL
KETONES UR QL: NEGATIVE
LEUKOCYTE EST, POC: NEGATIVE
NITRITE UR-MCNC: NEGATIVE MG/ML
PH UR: 6 [PH] (ref 5–8)
PROT UR STRIP-MCNC: ABNORMAL MG/DL
RBC # UR STRIP: NEGATIVE /UL
SP GR UR: 1.03 (ref 1–1.03)
UROBILINOGEN UR QL: ABNORMAL

## 2024-05-28 PROCEDURE — 1160F RVW MEDS BY RX/DR IN RCRD: CPT | Performed by: UROLOGY

## 2024-05-28 PROCEDURE — 99213 OFFICE O/P EST LOW 20 MIN: CPT | Performed by: UROLOGY

## 2024-05-28 PROCEDURE — 81002 URINALYSIS NONAUTO W/O SCOPE: CPT | Performed by: UROLOGY

## 2024-05-28 PROCEDURE — 1159F MED LIST DOCD IN RCRD: CPT | Performed by: UROLOGY

## 2024-05-28 RX ORDER — BUDESONIDE AND FORMOTEROL FUMARATE DIHYDRATE 160; 4.5 UG/1; UG/1
AEROSOL RESPIRATORY (INHALATION)
COMMUNITY
Start: 2024-03-26

## 2024-05-28 RX ORDER — ERGOCALCIFEROL 1.25 MG/1
1 CAPSULE ORAL WEEKLY
COMMUNITY
Start: 2024-05-13

## 2024-05-28 RX ORDER — SEMAGLUTIDE 0.68 MG/ML
INJECTION, SOLUTION SUBCUTANEOUS
COMMUNITY
Start: 2024-03-31

## 2024-05-28 RX ORDER — NITROFURANTOIN 25; 75 MG/1; MG/1
100 CAPSULE ORAL 2 TIMES DAILY
Qty: 30 CAPSULE | Refills: 3 | Status: SHIPPED | OUTPATIENT
Start: 2024-05-28

## 2024-05-28 NOTE — PROGRESS NOTES
Chief Complaint:   Renal calculus    HPI:   69 y.o. female turns today she feels great doing great urine is negative thus far stone free I will see her back on an as-needed basis    Past Medical History:     Past Medical History:   Diagnosis Date    Anxiety and depression     Asthma, extrinsic     Diabetes mellitus     Hypertension     PONV (postoperative nausea and vomiting)     Rheumatoid arthritis     Sleep apnea     uses c-pap       Current Meds:     Current Outpatient Medications   Medication Sig Dispense Refill    albuterol sulfate  (90 Base) MCG/ACT inhaler Inhale 2 puffs Every 6 (Six) Hours As Needed for Wheezing. 18 g 11    bisoprolol-hydrochlorothiazide (ZIAC) 5-6.25 MG per tablet Take 1 tablet by mouth Daily.  2    cetirizine (zyrTEC) 10 MG tablet Take 1 tablet by mouth Daily.      ciprofloxacin (Cipro) 500 MG tablet Take 1 tablet by mouth 2 (Two) Times a Day. 6 tablet 0    dapagliflozin Propanediol (Farxiga) 10 MG tablet Take 10 mg by mouth Daily.      glimepiride (AMARYL) 2 MG tablet Take 1 tablet by mouth Every Morning Before Breakfast.      hydroxychloroquine (PLAQUENIL) 200 MG tablet Take 1 tablet by mouth 2 (Two) Times a Day.  1    Influenza Vac Subunit Quad (FLUCELVAX) 0.5 ML suspension prefilled syringe injection Flucelvax Quad [id number] (PF) 60 mcg (15 mcg x 4)/0.5 mL IM syringe      montelukast (SINGULAIR) 10 MG tablet Take 1 tablet by mouth Every Evening.  5    Multiple Vitamins-Minerals (MULTIVITAMIN WITH MINERALS) tablet tablet Take 1 tablet by mouth Daily.      naproxen (NAPROSYN) 500 MG tablet naproxen 500 mg tablet      ondansetron ODT (ZOFRAN-ODT) 4 MG disintegrating tablet Place 1 tablet on the tongue Every 6 (Six) Hours As Needed for Nausea or Vomiting. 12 tablet 0    oxyCODONE-acetaminophen (Percocet)  MG per tablet Take 1 tablet by mouth Every 6 (Six) Hours As Needed for Moderate Pain. 10 tablet 0    oxyCODONE-acetaminophen (Percocet)  MG per tablet Take 1 tablet by  mouth Every 6 (Six) Hours As Needed for Moderate Pain (Pain). 12 tablet 0    oxyCODONE-acetaminophen (PERCOCET) 7.5-325 MG per tablet Take 1 tablet by mouth Every 6 (Six) Hours As Needed for Moderate Pain. 12 tablet 0    sertraline (ZOLOFT) 100 MG tablet Take 1 tablet by mouth Daily.      tamsulosin (FLOMAX) 0.4 MG capsule 24 hr capsule Take 1 capsule by mouth Daily. 30 capsule 0     No current facility-administered medications for this visit.        Allergies:      Allergies   Allergen Reactions    Benzoyl Peroxide Hives    Humira [Adalimumab] Hives        Past Surgical History:     Past Surgical History:   Procedure Laterality Date    ABDOMINAL SURGERY      CARDIAC CATHETERIZATION      CARPAL TUNNEL RELEASE      COLONOSCOPY      ENDOSCOPY      EXTRACORPOREAL SHOCK WAVE LITHOTRIPSY (ESWL) Left 10/13/2023    Procedure: EXTRACORPOREAL SHOCKWAVE LITHOTRIPSY;  Surgeon: Prudencio Helton MD;  [facility];  Service: Urology;  Laterality: Left;    GALLBLADDER SURGERY      HIP BIPOLAR REPLACEMENT Bilateral     HYSTERECTOMY      JOINT REPLACEMENT      SKIN BIOPSY      TONSILLECTOMY         Social History:     Social History     Socioeconomic History    Marital status:    Tobacco Use    Smoking status: Never    Smokeless tobacco: Never   Vaping Use    Vaping status: Never Used   Substance and Sexual Activity    Alcohol use: No    Drug use: No    Sexual activity: Defer       Family History:     Family History   Problem Relation Age of Onset    Diabetes Mother     Diabetes Father     Heart failure Father     Diabetes Sister     Diabetes Brother        Review of Systems:     Review of Systems   Constitutional: Negative.  Negative for activity change, appetite change, chills, diaphoresis, fatigue and unexpected weight change.   HENT:  Negative for congestion, dental problem, drooling, ear discharge, ear pain, facial swelling, hearing loss, mouth sores, nosebleeds, postnasal drip, rhinorrhea, sinus pressure,  sneezing, sore throat, tinnitus, trouble swallowing and voice change.    Eyes: Negative.  Negative for photophobia, pain, discharge, redness, itching and visual disturbance.   Respiratory: Negative.  Negative for apnea, cough, choking, chest tightness, shortness of breath, wheezing and stridor.    Cardiovascular: Negative.  Negative for chest pain, palpitations and leg swelling.   Gastrointestinal: Negative.  Negative for abdominal distention, abdominal pain, anal bleeding, blood in stool, constipation, diarrhea, nausea, rectal pain and vomiting.   Endocrine: Negative.  Negative for cold intolerance, heat intolerance, polydipsia, polyphagia and polyuria.   Musculoskeletal: Negative.  Negative for arthralgias, back pain, gait problem, joint swelling, myalgias, neck pain and neck stiffness.   Skin: Negative.  Negative for color change, pallor, rash and wound.   Allergic/Immunologic: Negative.  Negative for environmental allergies, food allergies and immunocompromised state.   Neurological: Negative.  Negative for dizziness, tremors, seizures, syncope, facial asymmetry, speech difficulty, weakness, light-headedness, numbness and headaches.   Hematological: Negative.  Negative for adenopathy. Does not bruise/bleed easily.   Psychiatric/Behavioral:  Negative for agitation, behavioral problems, confusion, decreased concentration, dysphoric mood, hallucinations, self-injury, sleep disturbance and suicidal ideas. The patient is not nervous/anxious and is not hyperactive.    All other systems reviewed and are negative.      Physical Exam:     Physical Exam  Constitutional:       Appearance: She is well-developed.   HENT:      Head: Normocephalic and atraumatic.      Right Ear: External ear normal.      Left Ear: External ear normal.   Eyes:      Conjunctiva/sclera: Conjunctivae normal.      Pupils: Pupils are equal, round, and reactive to light.   Cardiovascular:      Rate and Rhythm: Normal rate and regular rhythm.       Heart sounds: Normal heart sounds.   Pulmonary:      Effort: Pulmonary effort is normal.      Breath sounds: Normal breath sounds.   Abdominal:      General: Bowel sounds are normal. There is no distension.      Palpations: Abdomen is soft. There is no mass.      Tenderness: There is no abdominal tenderness. There is no guarding or rebound.   Genitourinary:     Vagina: No vaginal discharge.   Musculoskeletal:         General: Normal range of motion.   Skin:     General: Skin is warm and dry.   Neurological:      Mental Status: She is alert.      Deep Tendon Reflexes: Reflexes are normal and symmetric.   Psychiatric:         Behavior: Behavior normal.         Thought Content: Thought content normal.         Judgment: Judgment normal.         I have reviewed the following portions of the patient's history: Allergies, current medications, past family history, past medical history, past social history, past surgical history, problem list, and ROS and confirm it is accurate.    Recent Image (CT and/or KUB):      CT Abdomen and Pelvis: No results found for this or any previous visit.       CT Stone Protocol: Results for orders placed during the hospital encounter of 09/30/23    CT Abdomen Pelvis Stone Protocol    Narrative  PROCEDURE: CT of the abdomen and pelvis performed without IV contrast on  September 30, 2023. The examination was performed with 4 mm axial  imaging and 4 mm sagittal and coronal reconstruction images. Total DLP =  1672. The examination was performed according to as low as reasonably  achievable dose protocol.    HISTORY: Bilateral flank pain.    FINDINGS:  Normal heart size with no pericardial effusion.  Bilateral calcified granulomas.  Fatty infiltration of the liver.  Cholecystectomy clips in the right upper quadrant with mild ectatic  common bile duct.  There is a 7 x 14.3 mm stone at the left UPJ with features of partial  left renal obstruction. The stone is seen best on image 54, series 2  and  there is mild to moderate stranding around the left kidney.  Bilateral nephrolithiasis.  No obstructing stone identified on the right side.  Right and left hip arthroplasty.  Normal appendix.  No bowel or right renal obstruction.  No acute process seen in the bladder.  No abdominal aortic aneurysm or retroperitoneal hemorrhage.  Multilevel degenerative disc disease throughout the lumbar spine with  small posterior bulging discs in the mid and lower lumbar spine levels.  Streak artifacts from the right and left hip arthroplasty limited  assessment of the lower pelvis.  Prior hysterectomy suggested.    Impression  1.  There is a 7 x 14.3 mm stone at the left UPJ with associated partial  left renal obstruction.  2.  Bilateral nephrolithiasis.  3.  Cholecystectomy.  4.  Fatty infiltration of the liver.  5.  Normal appendix.  6.  Hysterectomy.  7.  Degenerative disc disease in the lumbar spine and lower thoracic  spine.  8.  No free fluid or free air.    This report was finalized on 9/30/2023 2:40 PM by Arnulfo Arellano MD.       KUB: Results for orders placed during the hospital encounter of 11/07/23    XR Abdomen KUB    Narrative  EXAM:  XR Abdomen, 1 View    EXAM DATE:  11/7/2023 3:03 PM    CLINICAL HISTORY:  kidney stone; N20.0-Calculus of kidney    TECHNIQUE:  Frontal supine view of the abdomen/pelvis.    COMPARISON:  10/17/2023    FINDINGS:  Gastrointestinal tract:  Unremarkable as visualized.  No dilation.  Organs:  Calcifications in the left lower pelvis may represent distal  ureteral stones.  Left kidney stones poorly assessed due to overlying  bowel gas artifact.  Bones/joints:  Bilateral hip arthroplasty.    Impression  1.  Calcifications left lower pelvis may represent distal ureteral  stones.  2.  Limited assessment of the left kidney due to overlying bowel gas.      This report was finalized on 11/7/2023 2:24 PM by Dr. Reinaldo Shepard MD.       Labs (past 3 months):      No visits with results within 3  Month(s) from this visit.   Latest known visit with results is:   Office Visit on 11/07/2023   Component Date Value Ref Range Status    Color 11/07/2023 Yellow  Yellow, Straw, Dark Yellow, Kalli Final    Clarity, UA 11/07/2023 Cloudy (A)  Clear Final    Specific Gravity  11/07/2023 1.015  1.005 - 1.030 Final    pH, Urine 11/07/2023 6.0  5.0 - 8.0 Final    Leukocytes 11/07/2023 500 Nilesh/ul (A)  Negative Final    Nitrite, UA 11/07/2023 Negative  Negative Final    Protein, POC 11/07/2023 1+ (A)  Negative mg/dL Final    Glucose, UA 11/07/2023 3+ (A)  Negative mg/dL Final    Ketones, UA 11/07/2023 Negative  Negative Final    Urobilinogen, UA 11/07/2023 Normal  Normal, 0.2 E.U./dL Final    Bilirubin 11/07/2023 Negative  Negative Final    Blood, UA 11/07/2023 3+ (A)  Negative Final    Lot Number 11/07/2023 [id number]   Final    Expiration Date 11/07/2023 102,024   Final    Urine Culture 11/07/2023 <25,000 CFU/mL Mixed Ching Isolated   Final        Procedure:       Assessment/Plan:   Renal calculus-we discussed the presence of the stone. We discussed the various therapeutic options available including percutaneous nephrostolithotomy, ureteroscopy and extracorporeal shockwave  lithotripsy.  We discussed the risks of lithotripsy including the passage of stones, the development of a large string of stones in the distal ureter known as Steinstrasse.  In the 3% incidence of that we will need to proceed with a ureteroscopy for obstructing fragments.  Extremely rare incidence of renal hematoma and the significance of this.  We discussed percutaneous nephrostolithotomy and its use as well as the risks and benefits such as the need for postoperative hospitalization, and the risk of damage to the kidney and the remote risk of a nephrectomy.  We also discussed the use of ureteroscopy in the upper tracts.  That this is as a decreased success rate to completely remove the stones on the first option and that very likely a stent will  be required requiring an additional procedure for removal.  We discussed the absolute relative indicators for intervention including the presence of sepsis and pain we cannot control ais the primary need for urgent intervention.  We discussed placement of a stent if indicated and the management of the stent as well.    Patient reports that she is not currently experiencing any symptoms of urinary incontinence.        This document has been electronically signed by LAN RIOS MD May 28, 2024 13:46 EDT    Dictated Utilizing Dragon Dictation: Part of this note may be an electronic transcription/translation of spoken language to printed text using the Dragon Dictation System.

## 2024-06-04 RX ORDER — HYDROXYCHLOROQUINE SULFATE 200 MG/1
200 TABLET, FILM COATED ORAL 2 TIMES DAILY
Qty: 180 TABLET | Refills: 0 | OUTPATIENT
Start: 2024-06-04

## 2024-06-04 NOTE — TELEPHONE ENCOUNTER
Refill denied. Pt has not been seen since 05/2023. She will need an appt before she can have the refill.

## 2025-01-01 ENCOUNTER — APPOINTMENT (OUTPATIENT)
Dept: GENERAL RADIOLOGY | Facility: HOSPITAL | Age: 70
End: 2025-01-01
Payer: MEDICARE

## 2025-01-01 ENCOUNTER — HOSPITAL ENCOUNTER (EMERGENCY)
Facility: HOSPITAL | Age: 70
Discharge: HOME | End: 2025-01-01
Attending: EMERGENCY MEDICINE
Payer: MEDICARE

## 2025-01-01 VITALS
HEART RATE: 80 BPM | BODY MASS INDEX: 43.95 KG/M2 | DIASTOLIC BLOOD PRESSURE: 96 MMHG | WEIGHT: 280 LBS | RESPIRATION RATE: 16 BRPM | SYSTOLIC BLOOD PRESSURE: 159 MMHG | TEMPERATURE: 97.4 F | OXYGEN SATURATION: 96 % | HEIGHT: 67 IN

## 2025-01-01 DIAGNOSIS — K59.00 CONSTIPATION, UNSPECIFIED CONSTIPATION TYPE: ICD-10-CM

## 2025-01-01 DIAGNOSIS — B02.9 HERPES ZOSTER WITHOUT COMPLICATION: Primary | ICD-10-CM

## 2025-01-01 LAB
ALBUMIN SERPL-MCNC: 3.5 G/DL (ref 3.5–5.2)
ALBUMIN/GLOB SERPL: 1 G/DL
ALP SERPL-CCNC: 60 U/L (ref 39–117)
ALT SERPL W P-5'-P-CCNC: 42 U/L (ref 1–33)
ANION GAP SERPL CALCULATED.3IONS-SCNC: 10.2 MMOL/L (ref 5–15)
AST SERPL-CCNC: 39 U/L (ref 1–32)
BACTERIA UR QL AUTO: ABNORMAL /HPF
BASOPHILS # BLD AUTO: 0.07 10*3/MM3 (ref 0–0.2)
BASOPHILS NFR BLD AUTO: 0.8 % (ref 0–1.5)
BILIRUB SERPL-MCNC: 0.2 MG/DL (ref 0–1.2)
BILIRUB UR QL STRIP: NEGATIVE
BUN SERPL-MCNC: 9 MG/DL (ref 8–23)
BUN/CREAT SERPL: 12.5 (ref 7–25)
CALCIUM SPEC-SCNC: 9.4 MG/DL (ref 8.6–10.5)
CHLORIDE SERPL-SCNC: 103 MMOL/L (ref 98–107)
CLARITY UR: CLEAR
CO2 SERPL-SCNC: 26.8 MMOL/L (ref 22–29)
COLOR UR: ABNORMAL
CREAT SERPL-MCNC: 0.72 MG/DL (ref 0.57–1)
CRP SERPL-MCNC: 0.83 MG/DL (ref 0–0.5)
DEPRECATED RDW RBC AUTO: 43.9 FL (ref 37–54)
EGFRCR SERPLBLD CKD-EPI 2021: 91.2 ML/MIN/1.73
EOSINOPHIL # BLD AUTO: 0.41 10*3/MM3 (ref 0–0.4)
EOSINOPHIL NFR BLD AUTO: 4.8 % (ref 0.3–6.2)
ERYTHROCYTE [DISTWIDTH] IN BLOOD BY AUTOMATED COUNT: 13.5 % (ref 12.3–15.4)
ERYTHROCYTE [SEDIMENTATION RATE] IN BLOOD: 28 MM/HR (ref 0–30)
GLOBULIN UR ELPH-MCNC: 3.5 GM/DL
GLUCOSE SERPL-MCNC: 193 MG/DL (ref 65–99)
GLUCOSE UR STRIP-MCNC: NEGATIVE MG/DL
HCT VFR BLD AUTO: 42.2 % (ref 34–46.6)
HGB BLD-MCNC: 13.2 G/DL (ref 12–15.9)
HGB UR QL STRIP.AUTO: ABNORMAL
HYALINE CASTS UR QL AUTO: ABNORMAL /LPF
IMM GRANULOCYTES # BLD AUTO: 0.04 10*3/MM3 (ref 0–0.05)
IMM GRANULOCYTES NFR BLD AUTO: 0.5 % (ref 0–0.5)
KETONES UR QL STRIP: ABNORMAL
LEUKOCYTE ESTERASE UR QL STRIP.AUTO: ABNORMAL
LYMPHOCYTES # BLD AUTO: 0.96 10*3/MM3 (ref 0.7–3.1)
LYMPHOCYTES NFR BLD AUTO: 11.2 % (ref 19.6–45.3)
MCH RBC QN AUTO: 27.8 PG (ref 26.6–33)
MCHC RBC AUTO-ENTMCNC: 31.3 G/DL (ref 31.5–35.7)
MCV RBC AUTO: 89 FL (ref 79–97)
MONOCYTES # BLD AUTO: 0.62 10*3/MM3 (ref 0.1–0.9)
MONOCYTES NFR BLD AUTO: 7.2 % (ref 5–12)
NEUTROPHILS NFR BLD AUTO: 6.46 10*3/MM3 (ref 1.7–7)
NEUTROPHILS NFR BLD AUTO: 75.5 % (ref 42.7–76)
NITRITE UR QL STRIP: NEGATIVE
NRBC BLD AUTO-RTO: 0 /100 WBC (ref 0–0.2)
PH UR STRIP.AUTO: 5.5 [PH] (ref 5–8)
PLATELET # BLD AUTO: 216 10*3/MM3 (ref 140–450)
PMV BLD AUTO: 9.2 FL (ref 6–12)
POTASSIUM SERPL-SCNC: 4.2 MMOL/L (ref 3.5–5.2)
PROT SERPL-MCNC: 7 G/DL (ref 6–8.5)
PROT UR QL STRIP: ABNORMAL
RBC # BLD AUTO: 4.74 10*6/MM3 (ref 3.77–5.28)
RBC # UR STRIP: ABNORMAL /HPF
REF LAB TEST METHOD: ABNORMAL
SODIUM SERPL-SCNC: 140 MMOL/L (ref 136–145)
SP GR UR STRIP: 1.02 (ref 1–1.03)
SQUAMOUS #/AREA URNS HPF: ABNORMAL /HPF
UROBILINOGEN UR QL STRIP: ABNORMAL
WBC # UR STRIP: ABNORMAL /HPF
WBC NRBC COR # BLD AUTO: 8.56 10*3/MM3 (ref 3.4–10.8)

## 2025-01-01 PROCEDURE — 74018 RADEX ABDOMEN 1 VIEW: CPT | Performed by: RADIOLOGY

## 2025-01-01 PROCEDURE — 80053 COMPREHEN METABOLIC PANEL: CPT | Performed by: PHYSICIAN ASSISTANT

## 2025-01-01 PROCEDURE — 85652 RBC SED RATE AUTOMATED: CPT | Performed by: PHYSICIAN ASSISTANT

## 2025-01-01 PROCEDURE — 36415 COLL VENOUS BLD VENIPUNCTURE: CPT

## 2025-01-01 PROCEDURE — 86140 C-REACTIVE PROTEIN: CPT | Performed by: PHYSICIAN ASSISTANT

## 2025-01-01 PROCEDURE — 74018 RADEX ABDOMEN 1 VIEW: CPT

## 2025-01-01 PROCEDURE — 99283 EMERGENCY DEPT VISIT LOW MDM: CPT

## 2025-01-01 PROCEDURE — 85025 COMPLETE CBC W/AUTO DIFF WBC: CPT | Performed by: PHYSICIAN ASSISTANT

## 2025-01-01 PROCEDURE — 81001 URINALYSIS AUTO W/SCOPE: CPT | Performed by: PHYSICIAN ASSISTANT

## 2025-01-01 RX ORDER — GABAPENTIN 300 MG/1
300 CAPSULE ORAL ONCE
Status: COMPLETED | OUTPATIENT
Start: 2025-01-01 | End: 2025-01-01

## 2025-01-01 RX ORDER — ACYCLOVIR 400 MG/1
800 TABLET ORAL
Qty: 70 TABLET | Refills: 0 | Status: SHIPPED | OUTPATIENT
Start: 2025-01-01 | End: 2025-01-08

## 2025-01-01 RX ORDER — GABAPENTIN 300 MG/1
300 CAPSULE ORAL 3 TIMES DAILY
Qty: 9 CAPSULE | Refills: 0 | Status: SHIPPED | OUTPATIENT
Start: 2025-01-01 | End: 2025-01-04

## 2025-01-01 RX ORDER — POLYETHYLENE GLYCOL 3350 17 G/17G
17 POWDER, FOR SOLUTION ORAL DAILY
Status: DISCONTINUED | OUTPATIENT
Start: 2025-01-01 | End: 2025-01-01 | Stop reason: HOSPADM

## 2025-01-01 RX ORDER — POLYETHYLENE GLYCOL 3350 17 G/17G
17 POWDER, FOR SOLUTION ORAL 2 TIMES DAILY
Qty: 850 G | Refills: 0 | Status: SHIPPED | OUTPATIENT
Start: 2025-01-01 | End: 2025-01-08

## 2025-01-01 RX ORDER — ACYCLOVIR 200 MG/1
800 CAPSULE ORAL ONCE
Status: COMPLETED | OUTPATIENT
Start: 2025-01-01 | End: 2025-01-01

## 2025-01-01 RX ADMIN — GABAPENTIN 300 MG: 300 CAPSULE ORAL at 16:37

## 2025-01-01 RX ADMIN — ACYCLOVIR 800 MG: 200 CAPSULE ORAL at 17:30

## 2025-01-01 RX ADMIN — POLYETHYLENE GLYCOL (3350) 17 G: 17 POWDER, FOR SOLUTION ORAL at 17:29

## 2025-01-01 NOTE — ED PROVIDER NOTES
Subjective   History of Present Illness  This is a 69 year old female patient who presents to the ER with chief complaint of rash. PMH significant for RA, diverticulosis, DM not requiring insulin. For 2 days, the patient has had a painful, burning, pruritic rash on the right side of her lower abdomen and side. At first, she thought it was a diverticulitis flare up but then a rash popped up. She denies fever, diarrhea, nausea, vomiting. She does endorse some constipation.       Review of Systems   Constitutional: Negative.  Negative for fever.   HENT: Negative.     Respiratory: Negative.     Cardiovascular: Negative.  Negative for chest pain.   Gastrointestinal: Negative.  Negative for abdominal pain.   Endocrine: Negative.    Genitourinary: Negative.  Negative for dysuria.   Skin:  Positive for rash. Negative for color change, pallor and wound.   Neurological: Negative.    Psychiatric/Behavioral: Negative.     All other systems reviewed and are negative.      Past Medical History:   Diagnosis Date    Anxiety and depression     Asthma, extrinsic     Diabetes mellitus     Hypertension     PONV (postoperative nausea and vomiting)     Rheumatoid arthritis     Sleep apnea     uses c-pap       Allergies   Allergen Reactions    Benzoyl Peroxide Hives    Humira [Adalimumab] Hives       Past Surgical History:   Procedure Laterality Date    ABDOMINAL SURGERY      CARDIAC CATHETERIZATION      CARPAL TUNNEL RELEASE      COLONOSCOPY      ENDOSCOPY      EXTRACORPOREAL SHOCK WAVE LITHOTRIPSY (ESWL) Left 10/13/2023    Procedure: EXTRACORPOREAL SHOCKWAVE LITHOTRIPSY;  Surgeon: Prudencio Helton MD;  Location: [facility];  Service: Urology;  Laterality: Left;    GALLBLADDER SURGERY      HIP BIPOLAR REPLACEMENT Bilateral     HYSTERECTOMY      JOINT REPLACEMENT      SKIN BIOPSY      TONSILLECTOMY         Family History   Problem Relation Age of Onset    Diabetes Mother     Diabetes Father     Heart failure Father     Diabetes  Sister     Diabetes Brother        Social History     Socioeconomic History    Marital status:    Tobacco Use    Smoking status: Never    Smokeless tobacco: Never   Vaping Use    Vaping status: Never Used   Substance and Sexual Activity    Alcohol use: No    Drug use: No    Sexual activity: Defer           Objective   Physical Exam  Vitals and nursing note reviewed.   Constitutional:       General: She is not in acute distress.     Appearance: She is well-developed. She is not diaphoretic.   HENT:      Head: Normocephalic and atraumatic.      Right Ear: External ear normal.      Left Ear: External ear normal.      Nose: Nose normal.   Eyes:      Conjunctiva/sclera: Conjunctivae normal.      Pupils: Pupils are equal, round, and reactive to light.   Neck:      Vascular: No JVD.      Trachea: No tracheal deviation.   Cardiovascular:      Rate and Rhythm: Normal rate and regular rhythm.      Heart sounds: Normal heart sounds. No murmur heard.  Pulmonary:      Effort: Pulmonary effort is normal. No respiratory distress.      Breath sounds: Normal breath sounds. No wheezing.   Abdominal:      General: Bowel sounds are normal.      Palpations: Abdomen is soft.      Tenderness: There is no abdominal tenderness.   Musculoskeletal:         General: No deformity. Normal range of motion.      Cervical back: Normal range of motion and neck supple.   Skin:     General: Skin is warm and dry.      Coloration: Skin is not pale.      Findings: Erythema and rash present.      Comments: Left lower abdominal and left side rash that appears to be shingles.    Neurological:      Mental Status: She is alert and oriented to person, place, and time.      Cranial Nerves: No cranial nerve deficit.   Psychiatric:         Behavior: Behavior normal.         Thought Content: Thought content normal.         Procedures       Results for orders placed or performed during the hospital encounter of 01/01/25   Comprehensive Metabolic Panel     Collection Time: 01/01/25  2:59 PM    Specimen: Arm, Left; Blood   Result Value Ref Range    Glucose 193 (H) 65 - 99 mg/dL    BUN 9 8 - 23 mg/dL    Creatinine 0.72 0.57 - 1.00 mg/dL    Sodium 140 136 - 145 mmol/L    Potassium 4.2 3.5 - 5.2 mmol/L    Chloride 103 98 - 107 mmol/L    CO2 26.8 22.0 - 29.0 mmol/L    Calcium 9.4 8.6 - 10.5 mg/dL    Total Protein 7.0 6.0 - 8.5 g/dL    Albumin 3.5 3.5 - 5.2 g/dL    ALT (SGPT) 42 (H) 1 - 33 U/L    AST (SGOT) 39 (H) 1 - 32 U/L    Alkaline Phosphatase 60 39 - 117 U/L    Total Bilirubin 0.2 0.0 - 1.2 mg/dL    Globulin 3.5 gm/dL    A/G Ratio 1.0 g/dL    BUN/Creatinine Ratio 12.5 7.0 - 25.0    Anion Gap 10.2 5.0 - 15.0 mmol/L    eGFR 91.2 >60.0 mL/min/1.73   C-reactive Protein    Collection Time: 01/01/25  2:59 PM    Specimen: Arm, Left; Blood   Result Value Ref Range    C-Reactive Protein 0.83 (H) 0.00 - 0.50 mg/dL   Sedimentation Rate    Collection Time: 01/01/25  2:59 PM    Specimen: Arm, Left; Blood   Result Value Ref Range    Sed Rate 28 0 - 30 mm/hr   CBC Auto Differential    Collection Time: 01/01/25  2:59 PM    Specimen: Arm, Left; Blood   Result Value Ref Range    WBC 8.56 3.40 - 10.80 10*3/mm3    RBC 4.74 3.77 - 5.28 10*6/mm3    Hemoglobin 13.2 12.0 - 15.9 g/dL    Hematocrit 42.2 34.0 - 46.6 %    MCV 89.0 79.0 - 97.0 fL    MCH 27.8 26.6 - 33.0 pg    MCHC 31.3 (L) 31.5 - 35.7 g/dL    RDW 13.5 12.3 - 15.4 %    RDW-SD 43.9 37.0 - 54.0 fl    MPV 9.2 6.0 - 12.0 fL    Platelets 216 140 - 450 10*3/mm3    Neutrophil % 75.5 42.7 - 76.0 %    Lymphocyte % 11.2 (L) 19.6 - 45.3 %    Monocyte % 7.2 5.0 - 12.0 %    Eosinophil % 4.8 0.3 - 6.2 %    Basophil % 0.8 0.0 - 1.5 %    Immature Grans % 0.5 0.0 - 0.5 %    Neutrophils, Absolute 6.46 1.70 - 7.00 10*3/mm3    Lymphocytes, Absolute 0.96 0.70 - 3.10 10*3/mm3    Monocytes, Absolute 0.62 0.10 - 0.90 10*3/mm3    Eosinophils, Absolute 0.41 (H) 0.00 - 0.40 10*3/mm3    Basophils, Absolute 0.07 0.00 - 0.20 10*3/mm3    Immature Grans,  Absolute 0.04 0.00 - 0.05 10*3/mm3    nRBC 0.0 0.0 - 0.2 /100 WBC   Urinalysis With Microscopic If Indicated (No Culture) - Urine, Clean Catch    Collection Time: 01/01/25  3:07 PM    Specimen: Urine, Clean Catch   Result Value Ref Range    Color, UA Dark Yellow (A) Yellow, Straw    Appearance, UA Clear Clear    pH, UA 5.5 5.0 - 8.0    Specific Gravity, UA 1.024 1.005 - 1.030    Glucose, UA Negative Negative    Ketones, UA Trace (A) Negative    Bilirubin, UA Negative Negative    Blood, UA Small (1+) (A) Negative    Protein,  mg/dL (2+) (A) Negative    Leuk Esterase, UA Small (1+) (A) Negative    Nitrite, UA Negative Negative    Urobilinogen, UA 0.2 E.U./dL 0.2 - 1.0 E.U./dL   Urinalysis, Microscopic Only - Urine, Clean Catch    Collection Time: 01/01/25  3:07 PM    Specimen: Urine, Clean Catch   Result Value Ref Range    RBC, UA 21-50 (A) None Seen, 0-2 /HPF    WBC, UA 0-2 None Seen, 0-2 /HPF    Bacteria, UA 1+ (A) None Seen /HPF    Squamous Epithelial Cells, UA 21-30 (A) None Seen, 0-2 /HPF    Hyaline Casts, UA None Seen None Seen /LPF    Methodology Automated Microscopy           ED Course  ED Course as of 01/01/25 1707   Wed Jan 01, 2025   1702 XR Abdomen KUB  IMPRESSION:     Nonobstructive bowel gas pattern.  No features of constipation.  Cholecystectomy clips in the right upper quadrant.  No pneumatosis or free air.  Degenerative disc disease in the lumbar spine.     This report was finalized on 1/1/2025 4:58 PM by Arnulfo Arellano MD   [MM]   1704 Patient diagnosed with shingles and constipation. Will be d/c home with rx for neurontin, acyclovir and miralax. Will f/u with PCP in 2 days or return to ER if symptoms worsen.  [MM]      ED Course User Index  [MM] Glory Nolen, PA                                                       Medical Decision Making    This is a 69 year old female patient who presents to the ER with chief complaint of rash. PMH significant for RA, diverticulosis, DM not requiring  insulin. For 2 days, the patient has had a painful, burning, pruritic rash on the right side of her lower abdomen and side. At first, she thought it was a diverticulitis flare up but then a rash popped up. She denies fever, diarrhea, nausea, vomiting. She does endorse some constipation.       Problems Addressed:  Constipation, unspecified constipation type: complicated acute illness or injury  Herpes zoster without complication: complicated acute illness or injury    Amount and/or Complexity of Data Reviewed  Labs: ordered. Decision-making details documented in ED Course.  Radiology: ordered. Decision-making details documented in ED Course.    Risk  OTC drugs.  Prescription drug management.        Final diagnoses:   Herpes zoster without complication   Constipation, unspecified constipation type       ED Disposition  ED Disposition       ED Disposition   Discharge    Condition   Stable    Comment   --               Josafat King MD  [address] [place], [place] [id number] [phone]    In 2 days           Medication List        New Prescriptions      acyclovir 400 MG tablet  Commonly known as: ZOVIRAX  Take 2 tablets by mouth 5 (Five) Times a Day for 7 days. Take no more than 5 doses a day.     polyethylene glycol 17 GM/SCOOP powder  Commonly known as: MIRALAX  Take 17 g by mouth 2 (Two) Times a Day for 7 days.               Where to Get Your Medications        These medications were sent to [facility] [address] [place] [address] - [address] [place] [address] [phone] [place] [id number] [place] [address]      Phone: [phone]   acyclovir 400 MG tablet  polyethylene glycol 17 GM/SCOOP powder            Glory Nolen PA  01/01/25 5097